# Patient Record
Sex: FEMALE | Race: WHITE | Employment: UNEMPLOYED | URBAN - METROPOLITAN AREA
[De-identification: names, ages, dates, MRNs, and addresses within clinical notes are randomized per-mention and may not be internally consistent; named-entity substitution may affect disease eponyms.]

---

## 2017-01-02 DIAGNOSIS — Z76.0 MEDICATION REFILL: ICD-10-CM

## 2017-01-03 RX ORDER — DIAZEPAM 10 MG/1
TABLET ORAL
Qty: 90 TAB | Refills: 2 | Status: SHIPPED | OUTPATIENT
Start: 2017-01-03 | End: 2017-06-09 | Stop reason: SDUPTHER

## 2017-06-09 DIAGNOSIS — Z76.0 MEDICATION REFILL: ICD-10-CM

## 2017-06-09 RX ORDER — DIAZEPAM 10 MG/1
TABLET ORAL
Qty: 90 TAB | Refills: 2 | Status: SHIPPED | OUTPATIENT
Start: 2017-06-09 | End: 2017-11-29 | Stop reason: SDUPTHER

## 2017-06-09 RX ORDER — BUTALBITAL, ACETAMINOPHEN, CAFFEINE AND CODEINE PHOSPHATE 50; 325; 40; 30 MG/1; MG/1; MG/1; MG/1
1-2 CAPSULE ORAL
Qty: 30 CAP | Refills: 5 | Status: SHIPPED | OUTPATIENT
Start: 2017-06-09 | End: 2018-01-25 | Stop reason: SDUPTHER

## 2017-06-27 ENCOUNTER — OFFICE VISIT (OUTPATIENT)
Dept: INTERNAL MEDICINE CLINIC | Age: 42
End: 2017-06-27

## 2017-06-27 VITALS
SYSTOLIC BLOOD PRESSURE: 120 MMHG | WEIGHT: 166.6 LBS | BODY MASS INDEX: 26.78 KG/M2 | RESPIRATION RATE: 18 BRPM | HEIGHT: 66 IN | DIASTOLIC BLOOD PRESSURE: 73 MMHG | TEMPERATURE: 98.3 F | OXYGEN SATURATION: 98 % | HEART RATE: 76 BPM

## 2017-06-27 DIAGNOSIS — M62.838 MUSCLE SPASMS OF NECK: ICD-10-CM

## 2017-06-27 DIAGNOSIS — F17.200 TOBACCO USE DISORDER: Primary | ICD-10-CM

## 2017-06-27 DIAGNOSIS — Z76.0 MEDICATION REFILL: ICD-10-CM

## 2017-06-27 RX ORDER — CYCLOBENZAPRINE HCL 10 MG
10 TABLET ORAL
Qty: 30 TAB | Refills: 5 | Status: SHIPPED | OUTPATIENT
Start: 2017-06-27 | End: 2018-02-28 | Stop reason: SDUPTHER

## 2017-06-27 RX ORDER — VARENICLINE TARTRATE 1 MG/1
TABLET, FILM COATED ORAL
Qty: 60 TAB | Refills: 1 | Status: SHIPPED | OUTPATIENT
Start: 2017-06-27 | End: 2017-09-25

## 2017-06-27 RX ORDER — NAPROXEN 500 MG/1
500 TABLET ORAL 2 TIMES DAILY WITH MEALS
Qty: 60 TAB | Refills: 5 | Status: SHIPPED | OUTPATIENT
Start: 2017-06-27 | End: 2018-09-18 | Stop reason: SDUPTHER

## 2017-06-27 RX ORDER — VARENICLINE TARTRATE 25 MG
KIT ORAL
Qty: 1 DOSE PACK | Refills: 0 | Status: SHIPPED | OUTPATIENT
Start: 2017-06-27 | End: 2018-09-18 | Stop reason: SDUPTHER

## 2017-06-27 NOTE — PROGRESS NOTES
Chief Complaint   Patient presents with    Nicotine Dependence     pt wants to quit would like to discuss Chantix    Request For New Medication     would like to discuss an anti-inflammatory       Pt preferred language for health care discussion is [place]. Is someone accompanying this pt? no    Is the patient using any DME equipment during OV? no    Depression Screening:  PHQ over the last two weeks 6/27/2017 10/1/2015 2/4/2014   PHQ Not Done Active Diagnosis of Depression or Bipolar Disorder - -   Little interest or pleasure in doing things Not at all More than half the days More than half the days   Feeling down, depressed or hopeless Not at all More than half the days Nearly every day   Total Score PHQ 2 0 4 5   Feeling tired or having little energy - - Nearly every day   Poor appetite or overeating - - Nearly every day   Feeling bad about yourself - or that you are a failure or have let yourself or your family down - - More than half the days   Trouble concentrating on things such as school, work, reading or watching TV - - Nearly every day   Moving or speaking so slowly that other people could have noticed; or the opposite being so fidgety that others notice - - Not at all   Thoughts of being better off dead, or hurting yourself in some way - - Not at all   How difficult have these problems made it for you to do your work, take care of your home and get along with others - - Somewhat difficult       Learning Assessment:  Learning Assessment 6/24/2014   PRIMARY LEARNER Patient   HIGHEST LEVEL OF EDUCATION - PRIMARY LEARNER  4 YEARS OF COLLEGE   BARRIERS PRIMARY LEARNER NONE   CO-LEARNER CAREGIVER No   PRIMARY LANGUAGE ENGLISH    NEED No   LEARNER PREFERENCE PRIMARY DEMONSTRATION   LEARNING SPECIAL TOPICS none   ANSWERED BY self   RELATIONSHIP SELF       Abuse Screening:  No flowsheet data found. Health Maintenance reviewed and discussed per provider.  Yes    Pt is due for Pneumo-13 or Peumo-23, Tdap. Please order/place referral if appropriate. Advance Directive:  1. Do you have an advance directive in place? Patient Reply:no    2. If not, would you like material regarding how to put one in place? Patient Reply: no    Coordination of Care:  1. Have you been to the ER, urgent care clinic since your last visit? Hospitalized since your last visit? no    2. Have you seen or consulted any other health care providers outside of the [address] [place] [address] since your last visit? Include any pap smears or colon screening.  no
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
## 2017-06-27 NOTE — MR AVS SNAPSHOT
Visit Information Date & Time Provider Department Dept. Phone Encounter #  
 6/27/2017  5:15 PM Paulina Muniz [employer] [phone] [id number] Follow-up Instructions Return in about 3 months (around 9/27/2017) for recheck on smoking. Madhuri Pretty Upcoming Health Maintenance Date Due INFLUENZA AGE 9 TO ADULT 8/1/2017 PAP AKA CERVICAL CYTOLOGY 10/20/2018 DTaP/Tdap/Td series (2 - Td) 6/27/2027 Allergies as of 6/27/2017  Review Complete On: 6/27/2017 By: Paulina Muniz MD  
 No Known Allergies Current Immunizations  Never Reviewed No immunizations on file. Not reviewed this visit You Were Diagnosed With   
  
 Codes Comments Tobacco use disorder    -  Primary ICD-10-CM: V25.278 ICD-9-CM: 305.1 Muscle spasms of neck     ICD-10-CM: T25.864 ICD-9-CM: 728.85 Medication refill     ICD-10-CM: Z76.0 ICD-9-CM: V68.1 Vitals BP Pulse Temp Resp Height(growth percentile) Weight(growth percentile) 120/73 76 98.3 °F (36.8 °C) (Oral) 18 5' 6\" (1.676 m) 166 lb 9.6 oz (75.6 kg) LMP SpO2 BMI OB Status Smoking Status 06/20/2017 98% 26.89 kg/m2 Having regular periods Current Every Day Smoker BMI and BSA Data Body Mass Index Body Surface Area  
 26.89 kg/m 2 1.88 m 2 Preferred Pharmacy Pharmacy Name Phone [facility] [place] [id number] [phone] Your Updated Medication List  
  
   
This list is accurate as of: 6/27/17  6:04 PM.  Always use your most recent med list.  
  
  
  
  
 codeine-butalbital-acetaminophen-caffeine -53-30 mg per capsule Commonly known as:  FIORICET WITH CODEINE Take 1-2 Caps by mouth three (3) times daily as needed for Headache. cyclobenzaprine 10 mg tablet Commonly known as:  FLEXERIL Take 1 Tab by mouth nightly as needed for Muscle Spasm(s). Indications: MUSCLE SPASM diazePAM 10 mg tablet Commonly known as:  VALIUM  
take 1 tablet by mouth every 12 hours if needed for anxiety  
  
 multivitamin tablet Commonly known as:  ONE A DAY Take 1 Tab by mouth daily. naproxen 500 mg tablet Commonly known as:  NAPROSYN Take 1 Tab by mouth two (2) times daily (with meals). pramoxine-hydrocortisone 2.5-1 % topical cream  
Commonly known as:  PROTOFOAM-HC Apply  to affected area three (3) times daily. For maximum of 2 weeks, for hemorrhoids * varenicline 0.5 mg (11)- 1 mg (42) Dspk Commonly known as:  CHANTIX STARTER CLIVE Take as directed on the package * varenicline 1 mg tablet Commonly known as:  Magaly Grijalva Take as directed on the package  
  
 venlafaxine-SR 75 mg capsule Commonly known as:  EFFEXOR-XR Take 3 Caps by mouth daily. * Notice: This list has 2 medication(s) that are the same as other medications prescribed for you. Read the directions carefully, and ask your doctor or other care provider to review them with you. Prescriptions Sent to Pharmacy Refills  
 varenicline (CHANTIX STARTER CLIVE) 0.5 mg (11)- 1 mg (42) DsPk 0 Sig: Take as directed on the package Class: Normal  
 Pharmacy: [place] [address] Ph #: [phone]  
 varenicline (CHANTIX) 1 mg tablet 1 Sig: Take as directed on the package Class: Normal  
 Pharmacy: [place] [address] Ph #: [phone]  
 naproxen (NAPROSYN) 500 mg tablet 5 Sig: Take 1 Tab by mouth two (2) times daily (with meals). Class: Normal  
 Pharmacy: [place] [address] Ph #: [phone] Route: Oral  
 cyclobenzaprine (FLEXERIL) 10 mg tablet 5 Sig: Take 1 Tab by mouth nightly as needed for Muscle Spasm(s). Indications: MUSCLE SPASM  Class: Normal  
 Pharmacy: [id number] [address] [place] [address]  #: [phone] Route: Oral  
  
Follow-up Instructions Return in about 3 months (around 9/27/2017) for recheck on smoking. Akin Brooks Introducing \[facility] [place] [facility]\"" & HEALTH SERVICES! Dear Lynette Alegria: Thank you for requesting a [employer] account. Our records indicate that you already have an active [employer] account. You can access your account anytime at [email or web address] Did you know that you can access your hospital and ER discharge instructions at any time in [employer]? You can also review all of your test results from your hospital stay or ER visit. Additional Information If you have questions, please visit the Frequently Asked Questions section of the [employer] website at [email or web address] Remember, [employer] is NOT to be used for urgent needs. For medical emergencies, dial 911. Now available from your iPhone and Android! Please provide this summary of care documentation to your next provider. Your primary care clinician is listed as [facility]. If you have any questions after today's visit, please call [phone].

## 2017-11-29 DIAGNOSIS — Z76.0 MEDICATION REFILL: ICD-10-CM

## 2017-11-29 RX ORDER — DIAZEPAM 10 MG/1
TABLET ORAL
Qty: 90 TAB | Refills: 1 | Status: SHIPPED | OUTPATIENT
Start: 2017-11-29 | End: 2018-04-12 | Stop reason: SDUPTHER

## 2018-01-25 DIAGNOSIS — Z76.0 MEDICATION REFILL: ICD-10-CM

## 2018-01-25 RX ORDER — BUTALBITAL, ACETAMINOPHEN, CAFFEINE AND CODEINE PHOSPHATE 50; 325; 40; 30 MG/1; MG/1; MG/1; MG/1
CAPSULE ORAL
Qty: 30 CAP | Refills: 4 | Status: SHIPPED | OUTPATIENT
Start: 2018-01-25 | End: 2018-07-06 | Stop reason: SDUPTHER

## 2018-02-24 ENCOUNTER — OFFICE VISIT (OUTPATIENT)
Dept: INTERNAL MEDICINE CLINIC | Age: 43
End: 2018-02-24

## 2018-02-24 VITALS
RESPIRATION RATE: 16 BRPM | TEMPERATURE: 97.9 F | OXYGEN SATURATION: 100 % | WEIGHT: 161 LBS | BODY MASS INDEX: 25.88 KG/M2 | HEART RATE: 67 BPM | HEIGHT: 66 IN | DIASTOLIC BLOOD PRESSURE: 70 MMHG | SYSTOLIC BLOOD PRESSURE: 132 MMHG

## 2018-02-24 DIAGNOSIS — J06.9 VIRAL URI WITH COUGH: Primary | ICD-10-CM

## 2018-02-24 DIAGNOSIS — J02.9 SORE THROAT: ICD-10-CM

## 2018-02-24 DIAGNOSIS — R68.83 CHILLS: ICD-10-CM

## 2018-02-24 LAB
QUICKVUE INFLUENZA TEST: NEGATIVE
S PYO AG THROAT QL: NEGATIVE
VALID INTERNAL CONTROL?: YES
VALID INTERNAL CONTROL?: YES
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
## 2018-02-24 NOTE — MR AVS SNAPSHOT
[facility] 
 
 
 [address] [facility] [address] 
[phone] Patient: Carli Andrade MRN: [id number] PSC:[id number] Visit Information Date & Time Provider Department Dept. Phone Encounter #  
 2/24/2018  3:15 PM [address] [phone] [id number] Upcoming Health Maintenance Date Due Influenza Age 5 to Adult 8/1/2017 PAP AKA CERVICAL CYTOLOGY 10/20/2018 DTaP/Tdap/Td series (2 - Td) 6/27/2027 Allergies as of 2/24/2018  Review Complete On: 2/24/2018 By: Diana Lunsford, DO No Known Allergies Current Immunizations  Never Reviewed No immunizations on file. Not reviewed this visit You Were Diagnosed With   
  
 Codes Comments Viral URI with cough    -  Primary ICD-10-CM: J06.9, B97.89 ICD-9-CM: 465.9 Sore throat     ICD-10-CM: J02.9 ICD-9-CM: 337 Chills     ICD-10-CM: R68.83 ICD-9-CM: 780.64 Vitals BP Pulse Temp Resp Height(growth percentile) Weight(growth percentile) 132/70 (BP 1 Location: Left arm, BP Patient Position: Sitting) 67 97.9 °F (36.6 °C) (Oral) 16 5' 6\" (1.676 m) 161 lb (73 kg) LMP SpO2 BMI OB Status Smoking Status 01/23/2018 (Approximate) 100% 25.99 kg/m2 Having regular periods Current Every Day Smoker Vitals History BMI and BSA Data Body Mass Index Body Surface Area  
 25.99 kg/m 2 1.84 m 2 Preferred Pharmacy Pharmacy Name Phone [facility] [phone] Your Updated Medication List  
  
   
This list is accurate as of 2/24/18  4:08 PM.  Always use your most recent med list.  
  
  
  
  
 codeine-butalbital-acetaminophen-caffeine -66-30 mg capsule Commonly known as:  FIORICET WITH CODEINE  
take 1 to 2 capsules by mouth three times a day if needed for headache  
  
 cyclobenzaprine 10 mg tablet Commonly known as:  FLEXERIL  
 Take 1 Tab by mouth nightly as needed for Muscle Spasm(s). Indications: MUSCLE SPASM  
  
 diazePAM 10 mg tablet Commonly known as:  VALIUM  
take 1 tablet by mouth every 12 hours if needed for anxiety  
  
 multivitamin tablet Commonly known as:  ONE A DAY Take 1 Tab by mouth daily. naproxen 500 mg tablet Commonly known as:  NAPROSYN Take 1 Tab by mouth two (2) times daily (with meals). pramoxine-hydrocortisone 2.5-1 % topical cream  
Commonly known as:  PROTOFOAM-HC Apply  to affected area three (3) times daily. For maximum of 2 weeks, for hemorrhoids  
  
 varenicline 0.5 mg (11)- 1 mg (42) Dspk Commonly known as:  CHANTIX STARTER CLIVE Take as directed on the package  
  
 venlafaxine-SR 75 mg capsule Commonly known as:  EFFEXOR-XR Take 3 Caps by mouth daily. We Performed the Following AMB POC RAPID INFLUENZA TEST [03827 CPT(R)] AMB POC RAPID STREP A [37407 CPT(R)] Patient Instructions Viral Respiratory Infection: Care Instructions Your Care Instructions Viruses are very small organisms. They grow in number after they enter your body. There are many types that cause different illnesses, such as colds and the mumps. The symptoms of a viral respiratory infection often start quickly. They include a fever, sore throat, and runny nose. You may also just not feel well. Or you may not want to eat much. Most viral respiratory infections are not serious. They usually get better with time and self-care. Antibiotics are not used to treat a viral infection. That's because antibiotics will not help cure a viral illness. In some cases, antiviral medicine can help your body fight a serious viral infection. Follow-up care is a key part of your treatment and safety. Be sure to make and go to all appointments, and call your doctor if you are having problems. It's also a good idea to know your test results and keep a list of the medicines you take. How can you care for yourself at home? · Rest as much as possible until you feel better. · Be safe with medicines. Take your medicine exactly as prescribed. Call your doctor if you think you are having a problem with your medicine. You will get more details on the specific medicine your doctor prescribes. · Take an over-the-counter pain medicine, such as acetaminophen (Tylenol), ibuprofen (Advil, Motrin), or naproxen (Aleve), as needed for pain and fever. Read and follow all instructions on the label. Do not give aspirin to anyone younger than 20. It has been linked to Reye syndrome, a serious illness. · Drink plenty of fluids, enough so that your urine is light yellow or clear like water. Hot fluids, such as tea or soup, may help relieve congestion in your nose and throat. If you have kidney, heart, or liver disease and have to limit fluids, talk with your doctor before you increase the amount of fluids you drink. · Try to clear mucus from your lungs by breathing deeply and coughing. · Gargle with warm salt water once an hour. This can help reduce swelling and throat pain. Use 1 teaspoon of salt mixed in 1 cup of warm water. · Do not smoke or allow others to smoke around you. If you need help quitting, talk to your doctor about stop-smoking programs and medicines. These can increase your chances of quitting for good. To avoid spreading the virus · Cough or sneeze into a tissue. Then throw the tissue away. · If you don't have a tissue, use your hand to cover your cough or sneeze. Then clean your hand. You can also cough into your sleeve. · Wash your hands often. Use soap and warm water. Wash for 15 to 20 seconds each time. · If you don't have soap and water near you, you can clean your hands with alcohol wipes or gel. When should you call for help? Call your doctor now or seek immediate medical care if: 
? · You have a new or higher fever. ? · Your fever lasts more than 48 hours. ? · You have trouble breathing. ? · You have a fever with a stiff neck or a severe headache. ? · You are sensitive to light. ? · You feel very sleepy or confused. ? Watch closely for changes in your health, and be sure to contact your doctor if: 
? · You do not get better as expected. Where can you learn more? Go to http://charbel-carlos.info/. Enter Y096 in the search box to learn more about \"Viral Respiratory Infection: Care Instructions. \" Current as of: May 12, 2017 Content Version: 11.4 © 7860-4315 Ghostery. Care instructions adapted under license by [employer] (which disclaims liability or warranty for this information). If you have questions about a medical condition or this instruction, always ask your healthcare professional. [address] any warranty or liability for your use of this information. Saline Nasal Washes: Care Instructions Your Care Instructions Saline nasal washes help keep the nasal passages open by washing out thick or dried mucus. This simple remedy can help relieve symptoms of allergies, sinusitis, and colds. It also can make the nose feel more comfortable by keeping the mucous membranes moist. You may notice a little burning sensation in your nose the first few times you use the solution, but this usually gets better in a few days. Follow-up care is a key part of your treatment and safety. Be sure to make and go to all appointments, and call your doctor if you are having problems. It's also a good idea to know your test results and keep a list of the medicines you take. How can you care for yourself at home? · You can buy premixed saline solution in a squeeze bottle or other sinus rinse products at a drugstore. Read and follow the instructions on the label. · You also can make your own saline solution by adding 1 teaspoon of salt and 1 teaspoon of baking soda to 2 cups of distilled water. · If you use a homemade solution, pour a small amount into a clean bowl. Using a rubber bulb syringe, squeeze the syringe and place the tip in the salt water. Pull a small amount of the salt water into the syringe by relaxing your hand. · Sit down with your head tilted slightly back. Do not lie down. Put the tip of the bulb syringe or the squeeze bottle a little way into one of your nostrils. Gently drip or squirt a few drops into the nostril. Repeat with the other nostril. Some sneezing and gagging are normal at first. 
· Gently blow your nose. · Wipe the syringe or bottle tip clean after each use. · Repeat this 2 or 3 times a day. · Use nasal washes gently if you have nosebleeds often. When should you call for help? Watch closely for changes in your health, and be sure to contact your doctor if: 
? · You often get nosebleeds. ? · You have problems doing the nasal washes. Where can you learn more? Go to http://charbel-carlos.info/. Enter [id number] in the search box to learn more about \"Saline Nasal Washes: Care Instructions. \" Current as of: May 12, 2017 Content Version: 11.4 © 6330-0048 Wir3s. Care instructions adapted under license by [employer] (which disclaims liability or warranty for this information). If you have questions about a medical condition or this instruction, always ask your healthcare professional. [place] [address] any warranty or liability for your use of this information. Sore Throat: Care Instructions Your Care Instructions Infection by bacteria or a virus causes most sore throats. Cigarette smoke, dry air, air pollution, allergies, and yelling can also cause a sore throat. Sore throats can be painful and annoying. Fortunately, most sore throats go away on their own. If you have a bacterial infection, your doctor may prescribe antibiotics. Follow-up care is a key part of your treatment and safety. Be sure to make and go to all appointments, and call your doctor if you are having problems. It's also a good idea to know your test results and keep a list of the medicines you take. How can you care for yourself at home? · If your doctor prescribed antibiotics, take them as directed. Do not stop taking them just because you feel better. You need to take the full course of antibiotics. · Gargle with warm salt water once an hour to help reduce swelling and relieve discomfort. Use 1 teaspoon of salt mixed in 1 cup of warm water. · Take an over-the-counter pain medicine, such as acetaminophen (Tylenol), ibuprofen (Advil, Motrin), or naproxen (Aleve). Read and follow all instructions on the label. · Be careful when taking over-the-counter cold or flu medicines and Tylenol at the same time. Many of these medicines have acetaminophen, which is Tylenol. Read the labels to make sure that you are not taking more than the recommended dose. Too much acetaminophen (Tylenol) can be harmful. · Drink plenty of fluids. Fluids may help soothe an irritated throat. Hot fluids, such as tea or soup, may help decrease throat pain. · Use over-the-counter throat lozenges to soothe pain. Regular cough drops or hard candy may also help. These should not be given to young children because of the risk of choking. · Do not smoke or allow others to smoke around you. If you need help quitting, talk to your doctor about stop-smoking programs and medicines. These can increase your chances of quitting for good. · Use a vaporizer or humidifier to add moisture to your bedroom. Follow the directions for cleaning the machine. When should you call for help? Call your doctor now or seek immediate medical care if: 
? · You have new or worse trouble swallowing. ? · Your sore throat gets much worse on one side. ? Watch closely for changes in your health, and be sure to contact your doctor if you do not get better as expected. Where can you learn more? Go to http://charbel-carlos.info/. Enter [id number] in the search box to learn more about \"Sore Throat: Care Instructions. \" Current as of: May 12, 2017 Content Version: 11.4 © 3076-7596 Cyntellect. Care instructions adapted under license by Sazze (which disclaims liability or warranty for this information). If you have questions about a medical condition or this instruction, always ask your healthcare professional. [address] any warranty or liability for your use of this information. Introducing [address]! Dear Maricarmen Ramirez: Thank you for requesting a Kerecis account. Our records indicate that you already have an active Kerecis account. You can access your account anytime at [email or web address] Did you know that you can access your hospital and ER discharge instructions at any time in Kerecis? You can also review all of your test results from your hospital stay or ER visit. Additional Information If you have questions, please visit the Frequently Asked Questions section of the Kerecis website at [email or web address] Remember, Kerecis is NOT to be used for urgent needs. For medical emergencies, dial 911. Now available from your iPhone and Android! Please provide this summary of care documentation to your next provider. Your primary care clinician is listed as [facility]. If you have any questions after today's visit, please call [phone].

## 2018-02-24 NOTE — PATIENT INSTRUCTIONS
Viral Respiratory Infection: Care Instructions  Your Care Instructions    Viruses are very small organisms. They grow in number after they enter your body. There are many types that cause different illnesses, such as colds and the mumps. The symptoms of a viral respiratory infection often start quickly. They include a fever, sore throat, and runny nose. You may also just not feel well. Or you may not want to eat much. Most viral respiratory infections are not serious. They usually get better with time and self-care. Antibiotics are not used to treat a viral infection. That's because antibiotics will not help cure a viral illness. In some cases, antiviral medicine can help your body fight a serious viral infection. Follow-up care is a key part of your treatment and safety. Be sure to make and go to all appointments, and call your doctor if you are having problems. It's also a good idea to know your test results and keep a list of the medicines you take. How can you care for yourself at home? · Rest as much as possible until you feel better. · Be safe with medicines. Take your medicine exactly as prescribed. Call your doctor if you think you are having a problem with your medicine. You will get more details on the specific medicine your doctor prescribes. · Take an over-the-counter pain medicine, such as acetaminophen (Tylenol), ibuprofen (Advil, Motrin), or naproxen (Aleve), as needed for pain and fever. Read and follow all instructions on the label. Do not give aspirin to anyone younger than 20. It has been linked to Reye syndrome, a serious illness. · Drink plenty of fluids, enough so that your urine is light yellow or clear like water. Hot fluids, such as tea or soup, may help relieve congestion in your nose and throat. If you have kidney, heart, or liver disease and have to limit fluids, talk with your doctor before you increase the amount of fluids you drink.   · Try to clear mucus from your lungs by breathing deeply and coughing. · Gargle with warm salt water once an hour. This can help reduce swelling and throat pain. Use 1 teaspoon of salt mixed in 1 cup of warm water. · Do not smoke or allow others to smoke around you. If you need help quitting, talk to your doctor about stop-smoking programs and medicines. These can increase your chances of quitting for good. To avoid spreading the virus  · Cough or sneeze into a tissue. Then throw the tissue away. · If you don't have a tissue, use your hand to cover your cough or sneeze. Then clean your hand. You can also cough into your sleeve. · Wash your hands often. Use soap and warm water. Wash for 15 to 20 seconds each time. · If you don't have soap and water near you, you can clean your hands with alcohol wipes or gel. When should you call for help? Call your doctor now or seek immediate medical care if:  ? · You have a new or higher fever. ? · Your fever lasts more than 48 hours. ? · You have trouble breathing. ? · You have a fever with a stiff neck or a severe headache. ? · You are sensitive to light. ? · You feel very sleepy or confused. ? Watch closely for changes in your health, and be sure to contact your doctor if:  ? · You do not get better as expected. Where can you learn more? Go to http://charbel-carlos.info/. Enter K992 in the search box to learn more about \"Viral Respiratory Infection: Care Instructions. \"  Current as of: May 12, 2017  Content Version: 11.4  © 5388-8317 Adv[employer]eme [employer]. Care instructions adapted under license by [employer] (which disclaims liability or warranty for this information). If you have questions about a medical condition or this instruction, always ask your healthcare professional. [place] [address] any warranty or liability for your use of this information.        Saline Nasal Washes: Care Instructions  Your Care Instructions  Saline nasal washes help keep the nasal passages open by washing out thick or dried mucus. This simple remedy can help relieve symptoms of allergies, sinusitis, and colds. It also can make the nose feel more comfortable by keeping the mucous membranes moist. You may notice a little burning sensation in your nose the first few times you use the solution, but this usually gets better in a few days. Follow-up care is a key part of your treatment and safety. Be sure to make and go to all appointments, and call your doctor if you are having problems. It's also a good idea to know your test results and keep a list of the medicines you take. How can you care for yourself at home? · You can buy premixed saline solution in a squeeze bottle or other sinus rinse products at a drugstore. Read and follow the instructions on the label. · You also can make your own saline solution by adding 1 teaspoon of salt and 1 teaspoon of baking soda to 2 cups of distilled water. · If you use a homemade solution, pour a small amount into a clean bowl. Using a rubber bulb syringe, squeeze the syringe and place the tip in the salt water. Pull a small amount of the salt water into the syringe by relaxing your hand. · Sit down with your head tilted slightly back. Do not lie down. Put the tip of the bulb syringe or the squeeze bottle a little way into one of your nostrils. Gently drip or squirt a few drops into the nostril. Repeat with the other nostril. Some sneezing and gagging are normal at first.  · Gently blow your nose. · Wipe the syringe or bottle tip clean after each use. · Repeat this 2 or 3 times a day. · Use nasal washes gently if you have nosebleeds often. When should you call for help? Watch closely for changes in your health, and be sure to contact your doctor if:  ? · You often get nosebleeds. ? · You have problems doing the nasal washes. Where can you learn more? Go to http://charbel-carlos.info/.   Enter [id number] [id number] in the search box to learn more about \"Saline Nasal Washes: Care Instructions. \"  Current as of: May 12, 2017  Content Version: 11.4  © 5768-5318 [employer]. Care instructions adapted under license by Bare[employer] (which disclaims liability or warranty for this information). If you have questions about a medical condition or this instruction, always ask your healthcare professional. [address] any warranty or liability for your use of this information. Sore Throat: Care Instructions  Your Care Instructions    Infection by bacteria or a virus causes most sore throats. Cigarette smoke, dry air, air pollution, allergies, and yelling can also cause a sore throat. Sore throats can be painful and annoying. Fortunately, most sore throats go away on their own. If you have a bacterial infection, your doctor may prescribe antibiotics. Follow-up care is a key part of your treatment and safety. Be sure to make and go to all appointments, and call your doctor if you are having problems. It's also a good idea to know your test results and keep a list of the medicines you take. How can you care for yourself at home? · If your doctor prescribed antibiotics, take them as directed. Do not stop taking them just because you feel better. You need to take the full course of antibiotics. · Gargle with warm salt water once an hour to help reduce swelling and relieve discomfort. Use 1 teaspoon of salt mixed in 1 cup of warm water. · Take an over-the-counter pain medicine, such as acetaminophen (Tylenol), ibuprofen (Advil, Motrin), or naproxen (Aleve). Read and follow all instructions on the label. · Be careful when taking over-the-counter cold or flu medicines and Tylenol at the same time. Many of these medicines have acetaminophen, which is Tylenol. Read the labels to make sure that you are not taking more than the recommended dose. Too much acetaminophen (Tylenol) can be harmful.   · Drink plenty of fluids. Fluids may help soothe an irritated throat. Hot fluids, such as tea or soup, may help decrease throat pain. · Use over-the-counter throat lozenges to soothe pain. Regular cough drops or hard candy may also help. These should not be given to young children because of the risk of choking. · Do not smoke or allow others to smoke around you. If you need help quitting, talk to your doctor about stop-smoking programs and medicines. These can increase your chances of quitting for good. · Use a vaporizer or humidifier to add moisture to your bedroom. Follow the directions for cleaning the machine. When should you call for help? Call your doctor now or seek immediate medical care if:  ? · You have new or worse trouble swallowing. ? · Your sore throat gets much worse on one side. ? Watch closely for changes in your health, and be sure to contact your doctor if you do not get better as expected. Where can you learn more? Go to http://charbel-carlos.info/. Enter [id number] in the search box to learn more about \"Sore Throat: Care Instructions. \"  Current as of: May 12, 2017  Content Version: 11.4  © 7894-0067 Healthwise, [employer]reet. Care instructions adapted under license by "[employer] (which disclaims liability or warranty for this information). If you have questions about a medical condition or this instruction, always ask your healthcare professional. [place] [address] any warranty or liability for your use of this information.

## 2018-02-24 NOTE — PROGRESS NOTES
Claudia Poe presents today for   Chief Complaint   Patient presents with    Ear Pain     bilateral     Sore Throat     x this am     Sweats     and chills x last pm        Claudia Poe preferred language for health care discussion is english/other. Is someone accompanying this pt? No  Is the patient using any DME equipment during OV?  No     Depression Screening:  PHQ over the last two weeks 2/24/2018 6/27/2017 10/1/2015 2/4/2014   PHQ Not Done Active Diagnosis of Depression or Bipolar Disorder Active Diagnosis of Depression or Bipolar Disorder - -   Little interest or pleasure in doing things Not at all Not at all More than half the days More than half the days   Feeling down, depressed or hopeless Not at all Not at all More than half the days Nearly every day   Total Score PHQ 2 0 0 4 5   Feeling tired or having little energy - - - Nearly every day   Poor appetite or overeating - - - Nearly every day   Feeling bad about yourself - or that you are a failure or have let yourself or your family down - - - More than half the days   Trouble concentrating on things such as school, work, reading or watching TV - - - Nearly every day   Moving or speaking so slowly that other people could have noticed; or the opposite being so fidgety that others notice - - - Not at all   Thoughts of being better off dead, or hurting yourself in some way - - - Not at all   How difficult have these problems made it for you to do your work, take care of your home and get along with others - - - Somewhat difficult       Learning Assessment:  Learning Assessment 6/24/2014   PRIMARY LEARNER Patient   HIGHEST LEVEL OF EDUCATION - PRIMARY LEARNER  4 YEARS OF COLLEGE   BARRIERS PRIMARY LEARNER NONE   CO-LEARNER CAREGIVER No   PRIMARY LANGUAGE ENGLISH    NEED No   LEARNER PREFERENCE PRIMARY DEMONSTRATION   LEARNING SPECIAL TOPICS none   ANSWERED BY self   RELATIONSHIP SELF       Abuse Screening:  Completed    Fall Risk  No flowsheet data found. Health Maintenance reviewed and discussed per provider. Yes; Pt will f/u with PCP. Advance Directive:  1. Do you have an advance directive in place? Patient Reply: No     2. If not, would you like material regarding how to put one in place? Patient Reply: No     Coordination of Care:  1. Have you been to the ER, urgent care clinic since your last visit? Hospitalized since your last visit? No     2. Have you seen or consulted any other health care providers outside of the [address] [place] [address] since your last visit?   No

## 2018-02-28 DIAGNOSIS — Z76.0 MEDICATION REFILL: ICD-10-CM

## 2018-02-28 DIAGNOSIS — M62.838 MUSCLE SPASMS OF NECK: ICD-10-CM

## 2018-02-28 RX ORDER — CYCLOBENZAPRINE HCL 10 MG
TABLET ORAL
Qty: 30 TAB | Refills: 5 | Status: SHIPPED | OUTPATIENT
Start: 2018-02-28 | End: 2019-03-13 | Stop reason: SDUPTHER

## 2018-04-03 DIAGNOSIS — Z76.0 MEDICATION REFILL: ICD-10-CM

## 2018-04-03 RX ORDER — VENLAFAXINE HYDROCHLORIDE 75 MG/1
225 CAPSULE, EXTENDED RELEASE ORAL DAILY
Qty: 270 CAP | Refills: 5 | Status: SHIPPED | OUTPATIENT
Start: 2018-04-03 | End: 2019-04-17 | Stop reason: SDUPTHER

## 2018-04-03 NOTE — TELEPHONE ENCOUNTER
Requested Prescriptions     Pending Prescriptions Disp Refills    venlafaxine-SR (EFFEXOR-XR) 75 mg capsule 270 Cap 5     Sig: Take 3 Caps by mouth daily.

## 2018-04-12 DIAGNOSIS — Z76.0 MEDICATION REFILL: ICD-10-CM

## 2018-04-12 RX ORDER — DIAZEPAM 10 MG/1
TABLET ORAL
Qty: 90 TAB | Refills: 1 | Status: SHIPPED | OUTPATIENT
Start: 2018-04-12 | End: 2018-09-18 | Stop reason: SDUPTHER

## 2018-09-14 ENCOUNTER — TELEPHONE (OUTPATIENT)
Dept: INTERNAL MEDICINE CLINIC | Age: 43
End: 2018-09-14

## 2018-09-18 ENCOUNTER — OFFICE VISIT (OUTPATIENT)
Dept: INTERNAL MEDICINE CLINIC | Age: 43
End: 2018-09-18

## 2018-09-18 VITALS
HEIGHT: 66 IN | OXYGEN SATURATION: 100 % | WEIGHT: 157 LBS | BODY MASS INDEX: 25.23 KG/M2 | SYSTOLIC BLOOD PRESSURE: 131 MMHG | HEART RATE: 73 BPM | TEMPERATURE: 98.2 F | RESPIRATION RATE: 20 BRPM | DIASTOLIC BLOOD PRESSURE: 75 MMHG

## 2018-09-18 DIAGNOSIS — Z13.1 SCREENING FOR DIABETES MELLITUS: ICD-10-CM

## 2018-09-18 DIAGNOSIS — F17.200 TOBACCO USE DISORDER: ICD-10-CM

## 2018-09-18 DIAGNOSIS — K64.9 HEMORRHOIDS, UNSPECIFIED HEMORRHOID TYPE: ICD-10-CM

## 2018-09-18 DIAGNOSIS — Z13.6 SCREENING FOR CARDIOVASCULAR CONDITION: ICD-10-CM

## 2018-09-18 DIAGNOSIS — Z76.0 MEDICATION REFILL: ICD-10-CM

## 2018-09-18 DIAGNOSIS — M62.838 MUSCLE SPASMS OF NECK: Primary | ICD-10-CM

## 2018-09-18 DIAGNOSIS — Z12.31 ENCOUNTER FOR SCREENING MAMMOGRAM FOR BREAST CANCER: ICD-10-CM

## 2018-09-18 DIAGNOSIS — Z13.0 SCREENING FOR DEFICIENCY ANEMIA: ICD-10-CM

## 2018-09-18 RX ORDER — DIAZEPAM 10 MG/1
10 TABLET ORAL
Qty: 90 TAB | Refills: 1 | Status: SHIPPED | OUTPATIENT
Start: 2018-09-18 | End: 2019-02-18 | Stop reason: SDUPTHER

## 2018-09-18 RX ORDER — NAPROXEN 500 MG/1
500 TABLET ORAL 2 TIMES DAILY WITH MEALS
Qty: 60 TAB | Refills: 5 | Status: SHIPPED | OUTPATIENT
Start: 2018-09-18 | End: 2019-12-04 | Stop reason: SDUPTHER

## 2018-09-18 RX ORDER — VARENICLINE TARTRATE 25 MG
KIT ORAL
Qty: 1 DOSE PACK | Refills: 0 | Status: SHIPPED | OUTPATIENT
Start: 2018-09-18 | End: 2020-01-29

## 2018-09-18 RX ORDER — METHOCARBAMOL 500 MG/1
500 TABLET, FILM COATED ORAL 3 TIMES DAILY
Qty: 60 TAB | Refills: 5 | Status: SHIPPED | OUTPATIENT
Start: 2018-09-18 | End: 2020-09-06

## 2018-09-18 RX ORDER — HYDROCORTISONE ACETATE, PRAMOXINE HCL 2.5; 1 G/100G; G/100G
CREAM TOPICAL 3 TIMES DAILY
Qty: 28.4 G | Refills: 5 | Status: SHIPPED | OUTPATIENT
Start: 2018-09-18 | End: 2021-12-15

## 2018-09-18 NOTE — PROGRESS NOTES
HISTORY OF PRESENT ILLNESS Tess Prado is a 41 y.o. female. Visit Vitals  /75 (BP 1 Location: Right arm, BP Patient Position: Sitting)  Pulse 73  Temp 98.2 °F (36.8 °C) (Oral)  Resp 20  
 Ht 5' 6\" (1.676 m)  Wt 157 lb (71.2 kg)  LMP 08/11/2018 (Approximate)  SpO2 100%  BMI 25.34 kg/m2 HPI Comments: Would like a RX for Chantix. Is worried about the side effects however. Medication Refill The history is provided by the patient. This is a new problem. Pertinent negatives include no chest pain, no headaches and no shortness of breath. Neck Pain The history is provided by the patient. The current episode started more than 1 week ago. The problem occurs daily. The problem has been gradually improving. Pertinent negatives include no chest pain, no headaches and no shortness of breath. Exacerbated by: certain neck positions. The symptoms are relieved by medications. Treatments tried: muscle relaxer and NSAID. The treatment provided moderate relief. Review of Systems Constitutional: Negative. Respiratory: Negative for shortness of breath. Cardiovascular: Negative for chest pain and palpitations. Musculoskeletal: Positive for neck pain. Neurological: Negative for dizziness and headaches. Physical Exam  
Constitutional: She is oriented to person, place, and time. She appears well-developed and well-nourished. No distress. Cardiovascular: Normal rate. Pulmonary/Chest: Effort normal.  
Musculoskeletal:  
Still some tightness in her neck. Flexion slightly limited. Neurological: She is alert and oriented to person, place, and time. Skin: Skin is warm and dry. She is not diaphoretic. Psychiatric: She has a normal mood and affect. Nursing note and vitals reviewed. ASSESSMENT and PLAN 
  ICD-10-CM ICD-9-CM 1. Muscle spasms of neck M62.838 728.85 naproxen (NAPROSYN) 500 mg tablet 2.  Hemorrhoids, unspecified hemorrhoid type K64.9 455.6 pramoxine-hydrocortisone (PROTOFOAM-HC) 2.5-1 % topical cream  
3. Medication refill Z76.0 V68.1 diazePAM (VALIUM) 10 mg tablet 4. Tobacco use disorder F17.200 305.1 varenicline (CHANTIX STARTER CLIVE) 0.5 mg (11)- 1 mg (42) DsPk 5. Encounter for screening mammogram for breast cancer Z12.31 V76.12 [facility] M[facility] BI SCREENING INCL CAD 6. Screening for cardiovascular condition Z13.6 V81.2 LIPID PANEL 7. Screening for diabetes mellitus I40.7 C93.3 METABOLIC PANEL, COMPREHENSIVE 8. Screening for deficiency anemia Z13.0 V78.1 CBC WITH AUTOMATED DIFF Neck spasms and tightness continue Uses flexeril at night. Would like RX robaxin for the daytime as it does not sedate her as much Other [facility]ills as [facility] Incidentally, Discussed BMI/weight, lifestyle, diet and exercise. Discussed effect on blood pressure, blood sugar, and joints especially Focus on limiting white carbs, portion control, and moving more.  
She has actually been losing weight and is very close to normal. 
 
Return for Carissa. Sultana Barber 41

## 2018-09-18 NOTE — PROGRESS NOTES
ROOM # 4 Jewell Louis presents today for Chief Complaint Patient presents with  Medication Refill Jewell Louis preferred language for health care discussion is english/other. Is someone accompanying this pt? no 
 
Is the patient using any DME equipment during OV? no 
 
Depression Screening: PHQ over the last two weeks 9/18/2018 2/24/2018 6/27/2017 10/1/2015 2/4/2014 PHQ Not Done - Active Diagnosis of Depression or Bipolar Disorder Active Diagnosis of Depression or Bipolar Disorder - - Little interest or pleasure in doing things Several days Not at all Not at all More than half the days More than half the days Feeling down, depressed, irritable, or hopeless Several days Not at all Not at all More than half the days Nearly every day Total Score PHQ 2 2 0 0 4 5 Feeling tired or having little energy - - - - Nearly every day Poor appetite, weight loss, or overeating - - - - Nearly every day Feeling bad about yourself - or that you are a failure or have let yourself or your family down - - - - More than half the days Trouble concentrating on things such as school, work, reading, or watching TV - - - - Nearly every day Moving or speaking so slowly that other people could have noticed; or the opposite being so fidgety that others notice - - - - Not at all Thoughts of being better off dead, or hurting yourself in some way - - - - Not at all How difficult have these problems made it for you to do your work, take care of your home and get along with others - - - - Somewhat difficult Learning Assessment: 
Learning Assessment 6/24/2014 PRIMARY LEARNER Patient HIGHEST LEVEL OF EDUCATION - PRIMARY LEARNER  4 YEARS OF COLLEGE  
BARRIERS PRIMARY LEARNER NONE  
CO-LEARNER CAREGIVER No  
PRIMARY LANGUAGE ENGLISH  NEED No  
LEARNER PREFERENCE PRIMARY DEMONSTRATION  
LEARNING SPECIAL TOPICS none ANSWERED BY self RELATIONSHIP SELF Abuse Screening: Abuse Screening Questionnaire 2/24/2018 Do you ever feel afraid of your partner? Mort Leaver Are you in a relationship with someone who physically or mentally threatens you? Mort Leaver Is it safe for you to go home? Ayesha Nolasco Health Maintenance reviewed and discussed per provider. Yes Zeeshan Malagon is due for Health Maintenance Due Topic Date Due  Influenza Age 5 to Adult  08/01/2018  PAP AKA CERVICAL CYTOLOGY  10/20/2018 Please order/place referral if appropriate. Advance Directive: 1. Do you have an advance directive in place? Patient Reply: no 
 
2. If not, would you like material regarding how to put one in place? Patient Reply: yes Coordination of Care: 1. Have you been to the ER, urgent care clinic since your last visit? Hospitalized since your last visit? no 
 
2. Have you seen or consulted any other health care providers outside of the [facility] since your last visit? Include any pap smears or colon screening.  no

## 2018-09-18 NOTE — MR AVS SNAPSHOT
[address] [place] [address] 
 
 
 [address] [facility] [address] [address] 
[phone] Patient: Kristy Kunz MRN: [id number] ADITHYA:[birth year] Visit Information Date & Time Provider Department Dept. Phone Encounter #  
 9/18/2018  8:15 AM Natividad You MD [facility] [phone] [id number] Follow-up Instructions Return in about 1 month (around 10/18/2018) for Well Woman Exam. Upcoming Health Maintenance Date Due Influenza Age 5 to Adult 8/1/2018 PAP AKA CERVICAL CYTOLOGY 10/20/2018 DTaP/Tdap/Td series (2 - Td) 6/27/2027 Allergies as of 9/18/2018  Review Complete On: 9/18/2018 By: Natividad You MD  
 No Known Allergies Current Immunizations  Never Reviewed No immunizations on file. Not reviewed this visit You Were Diagnosed With   
  
 Codes Comments Muscle spasms of neck    -  Primary ICD-10-CM: R79.197 ICD-9-CM: 728.85 Hemorrhoids, unspecified hemorrhoid type     ICD-10-CM: K64.9 ICD-9-CM: 455.6 Medication refill     ICD-10-CM: Z76.0 ICD-9-CM: V68.1 Tobacco use disorder     ICD-10-CM: F17.200 ICD-9-CM: 305.1 Encounter for screening mammogram for breast cancer     ICD-10-CM: Z12.31 
ICD-9-CM: V76.12 Screening for cardiovascular condition     ICD-10-CM: Z13.6 ICD-9-CM: V81.2 Screening for diabetes mellitus     ICD-10-CM: Z13.1 ICD-9-CM: V77.1 Screening for deficiency anemia     ICD-10-CM: Z13.0 ICD-9-CM: V78.1 Vitals BP Pulse Temp Resp Height(growth percentile) Weight(growth percentile) 131/75 (BP 1 Location: Right arm, BP Patient Position: Sitting) 73 98.2 °F (36.8 °C) (Oral) 20 5' 6\" (1.676 m) 157 lb (71.2 kg) LMP SpO2 BMI OB Status Smoking Status 08/11/2018 (Approximate) 100% 25.34 kg/m2 Having regular periods Current Every Day Smoker Vitals History BMI and BSA Data  Body Mass Index Body Surface Area  
 25.34 kg/m 2 1.82 m 2  
  
 Preferred Pharmacy Pharmacy Name Phone [address] [phone] Your Updated Medication List  
  
   
This list is accurate as of 9/18/18  8:45 AM.  Always use your most recent med list.  
  
  
  
  
 codeine-butalbital-acetaminophen-caffeine -18-30 mg capsule Commonly known as:  FIORICET WITH CODEINE  
take 1-2 capsules by mouth three times a day if needed for headache  
  
 cyclobenzaprine 10 mg tablet Commonly known as:  FLEXERIL  
take 1 tablet by mouth every evening if needed for muscle spasm  
  
 diazePAM 10 mg tablet Commonly known as:  VALIUM Take 1 Tab by mouth every twelve (12) hours as needed for Anxiety. Max Daily Amount: 20 mg.  
  
 methocarbamol 500 mg tablet Commonly known as:  ROBAXIN Take 1 Tab by mouth three (3) times daily. multivitamin tablet Commonly known as:  ONE A DAY Take 1 Tab by mouth daily. naproxen 500 mg tablet Commonly known as:  NAPROSYN Take 1 Tab by mouth two (2) times daily (with meals). pramoxine-hydrocortisone 2.5-1 % topical cream  
Commonly known as:  PROTOFOAM-HC Apply  to affected area three (3) times daily. For maximum of 2 weeks, for hemorrhoids  
  
 varenicline 0.5 mg (11)- 1 mg (42) Dspk Commonly known as:  CHANTIX STARTER CLIVE Take as directed on the package  
  
 venlafaxine-SR 75 mg capsule Commonly known as:  EFFEXOR-XR Take 3 Caps by mouth daily. Prescriptions Printed Refills  
 diazePAM (VALIUM) 10 mg tablet 1 Sig: Take 1 Tab by mouth every twelve (12) hours as needed for Anxiety. Max Daily Amount: 20 mg.  
 Class: Print Route: Oral  
  
Prescriptions Sent to Pharmacy Refills  
 naproxen (NAPROSYN) 500 mg tablet 5 Sig: Take 1 Tab by mouth two (2) times daily (with meals). Class: Normal  
 [facility] [address] [address] [address]  #: [phone]  Route: Oral  
 pramoxine-hydrocortisone (PROTOFOAM-HC) 2.5-1 % topical cream 5 Sig: Apply  to affected area three (3) times daily. For maximum of 2 weeks, for hemorrhoids Class: Normal  
 Pharmacy: [place] [address] Ph #: [phone] Route: Topical  
 methocarbamol (ROBAXIN) 500 mg tablet 5 Sig: Take 1 Tab by mouth three (3) times daily. Class: Normal  
 Pharmacy: [place] [address] Ph #: [phone] Route: Oral  
 varenicline (CHANTIX STARTER CLIVE) 0.5 mg (11)- 1 mg (42) DsPk 0 Sig: Take as directed on the package Class: Normal  
 Pharmacy: [place] [address] Ph #: [phone] Follow-up Instructions Return in about 1 month (around 10/18/2018) for Well Woman Exam. To-Do List   
 09/18/2018 Lab:  CBC WITH AUTOMATED DIFF   
  
 09/18/2018 Lab:  LIPID PANEL Around 09/18/2018 Imaging:  [facility] MAMMO BI SCREENING INCL CAD   
  
 09/18/2018 Lab:  METABOLIC PANEL, COMPREHENSIVE [facility]! Dear Zeinab Strong: Thank you for requesting a Jumpstarter account. Our records indicate that you already have an active Jumpstarter account. You can access your account anytime at [email or web address] Did you know that you can access your hospital and ER discharge instructions at any time in Jumpstarter? You can also review all of your test results from your hospital stay or ER visit. Additional Information If you have questions, please visit the Frequently Asked Questions section of the Jumpstarter website at [email or web address] Remember, Jumpstarter is NOT to be used for urgent needs. For medical emergencies, dial 911. Now available from your iPhone and Android! Please provide this summary of care documentation to your next provider. Your primary care clinician is listed as [facility]. If you have any questions after today's visit, please call [phone].

## 2018-09-21 NOTE — TELEPHONE ENCOUNTER
PA submitted via telephone. Was informed that PA has gone to pharm for further review. Turn around time in 4 days.

## 2018-09-26 NOTE — TELEPHONE ENCOUNTER
Pharm contacted. 2 pt identifiers confirmed. Was informed that pt has been notified and prescription was picked up yesterday 09/25/2018.

## 2018-09-28 ENCOUNTER — HOSPITAL ENCOUNTER (OUTPATIENT)
Dept: MAMMOGRAPHY | Age: 43
Discharge: HOME | End: 2018-09-28
Attending: INTERNAL MEDICINE
Payer: COMMERCIAL

## 2018-09-28 DIAGNOSIS — Z12.31 ENCOUNTER FOR SCREENING MAMMOGRAM FOR BREAST CANCER: ICD-10-CM

## 2018-09-28 PROCEDURE — 77067 SCR MAMMO BI INCL CAD: CPT

## 2018-10-18 ENCOUNTER — TELEPHONE (OUTPATIENT)
Dept: INTERNAL MEDICINE CLINIC | Age: 43
End: 2018-10-18

## 2018-10-18 DIAGNOSIS — F17.200 SMOKING ADDICTION: Primary | ICD-10-CM

## 2018-10-18 RX ORDER — VARENICLINE TARTRATE 1 MG/1
1 TABLET, FILM COATED ORAL 2 TIMES DAILY
Qty: 60 TAB | Refills: 2 | Status: SHIPPED | OUTPATIENT
Start: 2018-10-18 | End: 2020-01-29 | Stop reason: SDUPTHER

## 2018-10-18 NOTE — TELEPHONE ENCOUNTER
Patient calling as directed by PCP to inform PCP when she is almost finish with her starter pack of Chantix to get the continuing pack.

## 2018-10-18 NOTE — TELEPHONE ENCOUNTER
Attempted to contact pt at  number, no answer  Pt mailbox is full and not accepting calls at this time. Will continue to try to contact pt.

## 2018-10-20 ENCOUNTER — LAB ONLY (OUTPATIENT)
Dept: INTERNAL MEDICINE CLINIC | Age: 43
End: 2018-10-20

## 2018-10-21 LAB
ALBUMIN SERPL-MCNC: 4.6 G/DL (ref 3.5–5.5)
ALBUMIN/GLOB SERPL: 2.1 {RATIO} (ref 1.2–2.2)
ALP SERPL-CCNC: 74 IU/L (ref 39–117)
ALT SERPL-CCNC: 20 IU/L (ref 0–32)
AST SERPL-CCNC: 16 IU/L (ref 0–40)
BASOPHILS # BLD AUTO: 0.1 X10E3/UL (ref 0–0.2)
BASOPHILS NFR BLD AUTO: 1 %
BILIRUB SERPL-MCNC: 0.2 MG/DL (ref 0–1.2)
BUN SERPL-MCNC: 9 MG/DL (ref 6–24)
BUN/CREAT SERPL: 13 (ref 9–23)
CALCIUM SERPL-MCNC: 7.8 MG/DL (ref 8.7–10.2)
CHLORIDE SERPL-SCNC: 101 MMOL/L (ref 96–106)
CHOLEST SERPL-MCNC: 240 MG/DL (ref 100–199)
CO2 SERPL-SCNC: 24 MMOL/L (ref 20–29)
CREAT SERPL-MCNC: 0.69 MG/DL (ref 0.57–1)
EOSINOPHIL # BLD AUTO: 0.1 X10E3/UL (ref 0–0.4)
EOSINOPHIL NFR BLD AUTO: 2 %
ERYTHROCYTE [DISTWIDTH] IN BLOOD BY AUTOMATED COUNT: 12.8 % (ref 12.3–15.4)
GLOBULIN SER CALC-MCNC: 2.2 G/DL (ref 1.5–4.5)
GLUCOSE SERPL-MCNC: 95 MG/DL (ref 65–99)
HCT VFR BLD AUTO: 41.1 % (ref 34–46.6)
HDLC SERPL-MCNC: 66 MG/DL
HGB BLD-MCNC: 13.6 G/DL (ref 11.1–15.9)
IMM GRANULOCYTES # BLD: 0 X10E3/UL (ref 0–0.1)
IMM GRANULOCYTES NFR BLD: 0 %
INTERPRETATION, 910389: NORMAL
LDLC SERPL CALC-MCNC: 141 MG/DL (ref 0–99)
LYMPHOCYTES # BLD AUTO: 2.5 X10E3/UL (ref 0.7–3.1)
LYMPHOCYTES NFR BLD AUTO: 34 %
MCH RBC QN AUTO: 31.7 PG (ref 26.6–33)
MCHC RBC AUTO-ENTMCNC: 33.1 G/DL (ref 31.5–35.7)
MCV RBC AUTO: 96 FL (ref 79–97)
MONOCYTES # BLD AUTO: 0.5 X10E3/UL (ref 0.1–0.9)
MONOCYTES NFR BLD AUTO: 7 %
NEUTROPHILS # BLD AUTO: 4.1 X10E3/UL (ref 1.4–7)
NEUTROPHILS NFR BLD AUTO: 56 %
PLATELET # BLD AUTO: 363 X10E3/UL (ref 150–379)
POTASSIUM SERPL-SCNC: 4.9 MMOL/L (ref 3.5–5.2)
PROT SERPL-MCNC: 6.8 G/DL (ref 6–8.5)
RBC # BLD AUTO: 4.29 X10E6/UL (ref 3.77–5.28)
SODIUM SERPL-SCNC: 145 MMOL/L (ref 134–144)
SPECIMEN STATUS REPORT, ROLRST: NORMAL
TRIGL SERPL-MCNC: 167 MG/DL (ref 0–149)
VLDLC SERPL CALC-MCNC: 33 MG/DL (ref 5–40)
WBC # BLD AUTO: 7.4 X10E3/UL (ref 3.4–10.8)

## 2018-11-01 ENCOUNTER — OFFICE VISIT (OUTPATIENT)
Dept: INTERNAL MEDICINE CLINIC | Age: 43
End: 2018-11-01

## 2018-11-01 VITALS
DIASTOLIC BLOOD PRESSURE: 75 MMHG | SYSTOLIC BLOOD PRESSURE: 110 MMHG | TEMPERATURE: 98.2 F | OXYGEN SATURATION: 100 % | BODY MASS INDEX: 25.23 KG/M2 | RESPIRATION RATE: 18 BRPM | HEIGHT: 66 IN | WEIGHT: 157 LBS | HEART RATE: 76 BPM

## 2018-11-01 DIAGNOSIS — Z76.0 MEDICATION REFILL: ICD-10-CM

## 2018-11-01 DIAGNOSIS — Z01.419 WELL WOMAN EXAM WITH ROUTINE GYNECOLOGICAL EXAM: Primary | ICD-10-CM

## 2018-11-01 RX ORDER — BUTALBITAL, ACETAMINOPHEN, CAFFEINE AND CODEINE PHOSPHATE 50; 325; 40; 30 MG/1; MG/1; MG/1; MG/1
CAPSULE ORAL
Qty: 30 CAP | Refills: 5 | Status: SHIPPED | OUTPATIENT
Start: 2018-11-01 | End: 2019-04-25 | Stop reason: SDUPTHER

## 2018-11-01 NOTE — PROGRESS NOTES
ROOM # 835 [facility] Bishop Mosher presents today for   Chief Complaint   Patient presents with    Physical     well women exam       [place] preferred language for health care discussion is english/other.     Is someone accompanying this pt? no    Is the patient using any DME equipment during OV? no    Depression Screening:  PHQ over the last two weeks 9/18/2018 2/24/2018 6/27/2017 10/1/2015 2/4/2014   PHQ Not Done - Active Diagnosis of Depression or Bipolar Disorder Active Diagnosis of Depression or Bipolar Disorder - -   Little interest or pleasure in doing things Several days Not at all Not at all More than half the days More than half the days   Feeling down, depressed, irritable, or hopeless Several days Not at all Not at all More than half the days Nearly every day   Total Score PHQ 2 2 0 0 4 5   Feeling tired or having little energy - - - - Nearly every day   Poor appetite, weight loss, or overeating - - - - Nearly every day   Feeling bad about yourself - or that you are a failure or have let yourself or your family down - - - - More than half the days   Trouble concentrating on things such as school, work, reading, or watching TV - - - - Nearly every day   Moving or speaking so slowly that other people could have noticed; or the opposite being so fidgety that others notice - - - - Not at all   Thoughts of being better off dead, or hurting yourself in some way - - - - Not at all   How difficult have these problems made it for you to do your work, take care of your home and get along with others - - - - Somewhat difficult       Learning Assessment:  Learning Assessment 6/24/2014   PRIMARY LEARNER Patient   HIGHEST LEVEL OF EDUCATION - PRIMARY LEARNER  4 YEARS OF COLLEGE   BARRIERS PRIMARY LEARNER NONE   CO-LEARNER CAREGIVER No   PRIMARY LANGUAGE ENGLISH    NEED No   LEARNER PREFERENCE PRIMARY DEMONSTRATION   LEARNING SPECIAL TOPICS none   ANSWERED BY self   RELATIONSHIP SELF       Abuse Screening:  Abuse Screening Questionnaire 2/24/2018   Do you ever feel afraid of your partner? N   Are you in a relationship with someone who physically or mentally threatens you? N   Is it safe for you to go home? Y       Fall Risk  No flowsheet data found. Health Maintenance reviewed and discussed per provider. Yes    Scottie Peterson is due for   Health Maintenance Due   Topic Date Due    PAP AKA CERVICAL CYTOLOGY  10/20/2018         Please order/place referral if appropriate. Advance Directive:  1. Do you have an advance directive in place? Patient Reply: no    2. If not, would you like material regarding how to put one in place? Patient Reply: no    Coordination of Care:  1. Have you been to the ER, urgent care clinic since your last visit? Hospitalized since your last visit? no    2. Have you seen or consulted any other health care providers outside of the [address] [place] [address] since your last visit? Include any pap smears or colon screening.  no

## 2018-11-01 NOTE — PROGRESS NOTES
HISTORY OF PRESENT ILLNESS  Kuldip Cortes is a 41 y.o. female. Visit Vitals  /75 (BP 1 Location: Left arm, BP Patient Position: Sitting)   Pulse 76   Temp 98.2 °F (36.8 °C) (Oral)   Resp 18   Ht 5' 6\" (1.676 m)   Wt 157 lb (71.2 kg)   SpO2 100%   BMI 25.34 kg/m²       Well Woman   The history is provided by the patient. This is a new problem. Review of Systems   All other systems reviewed and are negative. Physical Exam   Constitutional: She is oriented to person, place, and time. She appears well-developed and well-nourished. No distress. Cardiovascular: Normal rate and regular rhythm. Pulmonary/Chest: Effort normal and breath sounds normal.   Genitourinary: No breast swelling, tenderness or discharge. There is no rash or tenderness on the right labia. There is no rash or tenderness on the left labia. Cervix exhibits friability. Cervix exhibits no motion tenderness and no discharge. Right adnexum displays no mass, no tenderness and no fullness. Left adnexum displays no mass, no tenderness and no fullness. Genitourinary Comments: cx looks inflamed   Musculoskeletal: She exhibits no edema. Neurological: She is alert and oriented to person, place, and time. Skin: Skin is warm and dry. She is not diaphoretic. Psychiatric: She has a normal mood and affect. Nursing note and vitals reviewed. ASSESSMENT and PLAN    ICD-10-CM ICD-9-CM    1. Well woman exam with routine gynecological exam Z01.419 V72.31 PAP + HPV DNA (HIGH RISK)      PAP + HPV DNA (HIGH RISK)   2. Medication refill Z76.0 V68.1 codeine-butalbital-acetaminophen-caffeine (FIORICET WITH CODEINE) -89-30 mg capsule       Reviewed recent lab and discussed cholesterol which is too high    May need referral back to gyn given cervical friability.  Awaiting PAP and HPV results first    Refill as noted    Has stopped smoking on chantix    F/u 6 months for med refills

## 2018-11-08 ENCOUNTER — TELEPHONE (OUTPATIENT)
Dept: INTERNAL MEDICINE CLINIC | Age: 43
End: 2018-11-08

## 2018-11-08 LAB
CYTOLOGIST CVX/VAG CYTO: NORMAL
CYTOLOGIST CVX/VAG CYTO: NORMAL
DX ICD CODE: NORMAL
DX ICD CODE: NORMAL
HPV I/H RISK 1 DNA CVX QL PROBE+SIG AMP: NEGATIVE
Lab: NORMAL
OTHER STN SPEC: NORMAL
OTHER STN SPEC: NORMAL
PATH REPORT.FINAL DX SPEC: NORMAL
PATH REPORT.FINAL DX SPEC: NORMAL
SPECIMEN STATUS REPORT, ROLRST: NORMAL
STAT OF ADQ CVX/VAG CYTO-IMP: NORMAL
STAT OF ADQ CVX/VAG CYTO-IMP: NORMAL

## 2018-11-08 NOTE — TELEPHONE ENCOUNTER
Patient called in stating she received an email telling her the labs she did were not processed due to the nurse forgetting to put her name on the specimen. Would like to speak with someone about this.   Can be reached at [phone]

## 2018-11-09 NOTE — TELEPHONE ENCOUNTER
Written by Jordon Gotti MD on 11/8/2018  6:44 PM   There was some confusion in the processing of your specimen. But everything is OK. Both the PAP and HPV were negative. Next screening can be in 4-5 years.

## 2018-11-09 NOTE — TELEPHONE ENCOUNTER
Called pt 2 pt identifiers confirmed informed pt of Dr Brody Cameron note below, pt stated understanding no other questions or concerns noted at this time.

## 2019-01-19 ENCOUNTER — TELEPHONE (OUTPATIENT)
Dept: INTERNAL MEDICINE CLINIC | Age: 44
End: 2019-01-19

## 2019-01-19 NOTE — TELEPHONE ENCOUNTER
PA requested for codeine-butalbital-acetaminophen-caffeine (FIORICET WITH CODEINE) -71-30 mg capsule     Document scanned into chart

## 2019-01-22 NOTE — TELEPHONE ENCOUNTER
PA attempted. Medication is available without Authorization. Pharm contacted. Pharm states they have a paid claim for this medication.

## 2019-01-22 NOTE — TELEPHONE ENCOUNTER
Attempted to initiate prior authorization for codeine-butalbital-acetaminophen-caffeine -04-30 mg capsule via CoverMyMeds. Received message that medication is available without authorization. Outgoing call to [employer]. Spoke to pharmacy staff, Jeniffer Bhatt. Medication claim went through [employer] with no issue.

## 2019-02-18 DIAGNOSIS — Z76.0 MEDICATION REFILL: ICD-10-CM

## 2019-02-18 RX ORDER — DIAZEPAM 10 MG/1
10 TABLET ORAL
Qty: 90 TAB | Refills: 1 | Status: SHIPPED | OUTPATIENT
Start: 2019-02-18 | End: 2019-06-10 | Stop reason: SDUPTHER

## 2019-02-18 NOTE — TELEPHONE ENCOUNTER
Requested Prescriptions     Pending Prescriptions Disp Refills    diazePAM (VALIUM) 10 mg tablet 90 Tab 1     Sig: Take 1 Tab by mouth every twelve (12) hours as needed for Anxiety. Max Daily Amount: 20 mg.

## 2019-02-18 NOTE — TELEPHONE ENCOUNTER
PCP: Angela Sandoval MD    Last appt: 11/1/2018  No future appointments. Requested Prescriptions     Pending Prescriptions Disp Refills    diazePAM (VALIUM) 10 mg tablet 90 Tab 1     Sig: Take 1 Tab by mouth every twelve (12) hours as needed for Anxiety. Max Daily Amount: 20 mg. Request for a 30 or 90 day supply? Provider Discretion    Pharmacy: [facility]     Other Comments:   printed and placed in PCP medication refill review folder.      Last UDS date:   Pain contract signed:

## 2019-03-13 DIAGNOSIS — M62.838 MUSCLE SPASMS OF NECK: ICD-10-CM

## 2019-03-13 DIAGNOSIS — Z76.0 MEDICATION REFILL: ICD-10-CM

## 2019-03-13 RX ORDER — CYCLOBENZAPRINE HCL 10 MG
TABLET ORAL
Qty: 30 TAB | Refills: 5 | Status: SHIPPED | OUTPATIENT
Start: 2019-03-13 | End: 2019-12-04 | Stop reason: SDUPTHER

## 2019-04-17 DIAGNOSIS — Z76.0 MEDICATION REFILL: ICD-10-CM

## 2019-04-17 RX ORDER — VENLAFAXINE HYDROCHLORIDE 75 MG/1
CAPSULE, EXTENDED RELEASE ORAL
Qty: 270 CAP | Refills: 5 | Status: SHIPPED | OUTPATIENT
Start: 2019-04-17 | End: 2020-02-11

## 2019-04-25 DIAGNOSIS — Z76.0 MEDICATION REFILL: ICD-10-CM

## 2019-04-25 RX ORDER — BUTALBITAL, ACETAMINOPHEN, CAFFEINE AND CODEINE PHOSPHATE 50; 325; 40; 30 MG/1; MG/1; MG/1; MG/1
2 CAPSULE ORAL
Qty: 30 CAP | Refills: 5 | Status: SHIPPED | OUTPATIENT
Start: 2019-04-25 | End: 2019-05-25

## 2019-06-10 DIAGNOSIS — Z76.0 MEDICATION REFILL: ICD-10-CM

## 2019-06-11 RX ORDER — DIAZEPAM 10 MG/1
TABLET ORAL
Qty: 90 TAB | Refills: 1 | Status: SHIPPED | OUTPATIENT
Start: 2019-06-11 | End: 2019-10-17 | Stop reason: SDUPTHER

## 2019-08-29 DIAGNOSIS — R51.9 NONINTRACTABLE EPISODIC HEADACHE, UNSPECIFIED HEADACHE TYPE: Primary | Chronic | ICD-10-CM

## 2019-08-29 RX ORDER — BUTALBITAL, ACETAMINOPHEN, CAFFEINE AND CODEINE PHOSPHATE 50; 325; 40; 30 MG/1; MG/1; MG/1; MG/1
CAPSULE ORAL
Qty: 30 CAP | Refills: 5 | Status: SHIPPED | OUTPATIENT
Start: 2019-08-29 | End: 2020-01-23 | Stop reason: SDUPTHER

## 2019-10-17 DIAGNOSIS — Z76.0 MEDICATION REFILL: ICD-10-CM

## 2019-10-17 RX ORDER — DIAZEPAM 10 MG/1
TABLET ORAL
Qty: 90 TAB | Refills: 1 | Status: SHIPPED | OUTPATIENT
Start: 2019-10-17 | End: 2020-01-23 | Stop reason: SDUPTHER

## 2019-10-25 ENCOUNTER — TELEPHONE (OUTPATIENT)
Dept: INTERNAL MEDICINE CLINIC | Age: 44
End: 2019-10-25

## 2019-10-25 NOTE — TELEPHONE ENCOUNTER
[facility] is requesting a prior auth for [facility] ER 75 mg ER    Last appointment 11/1/2018

## 2019-10-29 NOTE — TELEPHONE ENCOUNTER
Jyoti has not yet replied to your PA request.   If Delmis Thayer has not replied to your request within 24 hours please contact Jyoti at [phone].

## 2019-10-30 NOTE — TELEPHONE ENCOUNTER
Prior Auth for patient medication Venlafaxine HCL ER 75 mg cap has been approved for coverage. This approval is effective from 10/30/2019 until 10/29/2020.

## 2019-12-04 DIAGNOSIS — Z76.0 MEDICATION REFILL: ICD-10-CM

## 2019-12-04 DIAGNOSIS — M62.838 MUSCLE SPASMS OF NECK: ICD-10-CM

## 2019-12-04 RX ORDER — CYCLOBENZAPRINE HCL 10 MG
TABLET ORAL
Qty: 30 TAB | Refills: 5 | Status: SHIPPED | OUTPATIENT
Start: 2019-12-04 | End: 2020-10-15

## 2019-12-04 RX ORDER — NAPROXEN 500 MG/1
TABLET ORAL
Qty: 60 TAB | Refills: 5 | Status: SHIPPED | OUTPATIENT
Start: 2019-12-04 | End: 2021-01-18

## 2020-01-02 ENCOUNTER — HOSPITAL ENCOUNTER (OUTPATIENT)
Dept: MAMMOGRAPHY | Age: 45
Discharge: HOME | End: 2020-01-02
Attending: INTERNAL MEDICINE
Payer: COMMERCIAL

## 2020-01-02 DIAGNOSIS — Z12.31 ENCOUNTER FOR SCREENING MAMMOGRAM FOR BREAST CANCER: ICD-10-CM

## 2020-01-02 PROCEDURE — 77067 SCR MAMMO BI INCL CAD: CPT

## 2020-01-02 PROCEDURE — 77063 BREAST TOMOSYNTHESIS BI: CPT

## 2020-01-23 ENCOUNTER — OFFICE VISIT (OUTPATIENT)
Dept: INTERNAL MEDICINE CLINIC | Age: 45
End: 2020-01-23

## 2020-01-23 VITALS
OXYGEN SATURATION: 98 % | HEIGHT: 66 IN | TEMPERATURE: 98 F | HEART RATE: 78 BPM | SYSTOLIC BLOOD PRESSURE: 114 MMHG | RESPIRATION RATE: 18 BRPM | DIASTOLIC BLOOD PRESSURE: 75 MMHG | BODY MASS INDEX: 23.95 KG/M2 | WEIGHT: 149 LBS

## 2020-01-23 DIAGNOSIS — R51.9 NONINTRACTABLE EPISODIC HEADACHE, UNSPECIFIED HEADACHE TYPE: Chronic | ICD-10-CM

## 2020-01-23 DIAGNOSIS — S00.31XA ABRASION OF NOSE WITH INFECTION, INITIAL ENCOUNTER: Primary | ICD-10-CM

## 2020-01-23 DIAGNOSIS — Z76.0 MEDICATION REFILL: ICD-10-CM

## 2020-01-23 DIAGNOSIS — L08.9 ABRASION OF NOSE WITH INFECTION, INITIAL ENCOUNTER: Primary | ICD-10-CM

## 2020-01-23 RX ORDER — BUTALBITAL, ACETAMINOPHEN, CAFFEINE AND CODEINE PHOSPHATE 50; 325; 40; 30 MG/1; MG/1; MG/1; MG/1
2 CAPSULE ORAL 3 TIMES DAILY
Qty: 30 CAP | Refills: 5 | Status: SHIPPED | OUTPATIENT
Start: 2020-01-23 | End: 2020-06-21

## 2020-01-23 RX ORDER — SULFAMETHOXAZOLE AND TRIMETHOPRIM 800; 160 MG/1; MG/1
1 TABLET ORAL 2 TIMES DAILY
Qty: 20 TAB | Refills: 0 | Status: SHIPPED | OUTPATIENT
Start: 2020-01-23 | End: 2020-02-02

## 2020-01-23 RX ORDER — DIAZEPAM 10 MG/1
10 TABLET ORAL
Qty: 90 TAB | Refills: 1 | Status: SHIPPED | OUTPATIENT
Start: 2020-01-23 | End: 2020-05-15

## 2020-01-23 NOTE — PROGRESS NOTES
HISTORY OF PRESENT ILLNESS  Edwin Desouza is a 45 y.o. female. Visit Vitals  /75 (BP 1 Location: Right arm, BP Patient Position: Sitting)   Pulse 78   Temp 98 °F (36.7 °C) (Oral)   Resp 18   Ht 5' 6\" (1.676 m)   Wt 149 lb (67.6 kg)   SpO2 98%   BMI 24.05 kg/m²       2-3 week altogether of upper respiratory sxs  Yesterday her glands began swelling  Nose became sore and there is a little crack with swelling in left nares    Nasal Pain   The history is provided by the patient (see comments). This is a new problem. Mass   The history is provided by the patient (see comments about swollen glands). This is a new problem. Review of Systems   Constitutional: Negative for chills and fever. HENT: Positive for sinus pain. Sore bump on nose--in the past this became a staph infection   Respiratory: Positive for cough. Skin: Itching: swollen lymph nodes. Physical Exam  Vitals signs and nursing note reviewed. Constitutional:       Appearance: She is well-developed. HENT:      Head:        Right Ear: Tympanic membrane, ear canal and external ear normal.      Left Ear: Ear canal and external ear normal.      Nose:      Comments: Some posterior pharynx draiange  Neck:      Musculoskeletal: Normal range of motion. Cardiovascular:      Rate and Rhythm: Normal rate and regular rhythm. Pulmonary:      Effort: Pulmonary effort is normal.      Breath sounds: Normal breath sounds. Lymphadenopathy:      Cervical: Cervical adenopathy present. Skin:     General: Skin is warm and dry. Neurological:      General: No focal deficit present. Mental Status: She is alert and oriented to person, place, and time. Psychiatric:         Mood and Affect: Mood normal.         ASSESSMENT and PLAN    ICD-10-CM ICD-9-CM    1. Abrasion of nose with infection, initial encounter S00.31XA 910.1     L08.9     2.  Nonintractable episodic headache, unspecified headache type R51 784.0 codeine-butalbital-acetaminophen-caffeine (FIORICET WITH CODEINE) -43-30 mg capsule   3. Medication refill Z76.0 V68.1 diazePAM (VALIUM) 10 mg tablet         given h/o staph infection (even though 10 yrs ago) will tx with bactrim  Warm compresses prn    F/u if not responding in 3-4 days.  So will add second medication

## 2020-01-23 NOTE — PROGRESS NOTES
ROOM # 801 [facility] presents today for   Chief Complaint   Patient presents with    Nasal Pain    Mass       Terressa Eagles preferred language for health care discussion is english/other.     Is someone accompanying this pt? no    Is the patient using any DME equipment during OV? no    Depression Screening:  3 most recent PHQ Screens 9/18/2018 2/24/2018 6/27/2017 10/1/2015 2/4/2014   PHQ Not Done - Active Diagnosis of Depression or Bipolar Disorder Active Diagnosis of Depression or Bipolar Disorder - -   Little interest or pleasure in doing things Several days Not at all Not at all More than half the days More than half the days   Feeling down, depressed, irritable, or hopeless Several days Not at all Not at all More than half the days Nearly every day   Total Score PHQ 2 2 0 0 4 5   Feeling tired or having little energy - - - - Nearly every day   Poor appetite, weight loss, or overeating - - - - Nearly every day   Feeling bad about yourself - or that you are a failure or have let yourself or your family down - - - - More than half the days   Trouble concentrating on things such as school, work, reading, or watching TV - - - - Nearly every day   Moving or speaking so slowly that other people could have noticed; or the opposite being so fidgety that others notice - - - - Not at all   Thoughts of being better off dead, or hurting yourself in some way - - - - Not at all   How difficult have these problems made it for you to do your work, take care of your home and get along with others - - - - Somewhat difficult       Learning Assessment:  Learning Assessment 6/24/2014   PRIMARY LEARNER Patient   HIGHEST LEVEL OF EDUCATION - PRIMARY LEARNER  4 YEARS OF COLLEGE   BARRIERS PRIMARY LEARNER NONE   CO-LEARNER CAREGIVER No   PRIMARY LANGUAGE ENGLISH    NEED No   LEARNER PREFERENCE PRIMARY DEMONSTRATION   LEARNING SPECIAL TOPICS none   ANSWERED BY self   RELATIONSHIP SELF       Abuse Screening:  Abuse Screening Questionnaire 2/24/2018   Do you ever feel afraid of your partner? N   Are you in a relationship with someone who physically or mentally threatens you? N   Is it safe for you to go home? Y       Fall Risk  No flowsheet data found. Health Maintenance reviewed and discussed per provider. Yes    Ree Reddy is due for   Health Maintenance Due   Topic Date Due    Influenza Age 5 to Adult  08/01/2019     Please order/place referral if appropriate. Advance Directive:  1. Do you have an advance directive in place? Patient Reply: no    2. If not, would you like material regarding how to put one in place? Patient Reply: no    Coordination of Care:  1. Have you been to the ER, urgent care clinic since your last visit? Hospitalized since your last visit? no    2. Have you seen or consulted any other health care providers outside of the [address] [place] [address] since your last visit? Include any pap smears or colon screening.  Endocrinologist

## 2020-01-27 ENCOUNTER — TELEPHONE (OUTPATIENT)
Dept: INTERNAL MEDICINE CLINIC | Age: 45
End: 2020-01-27

## 2020-01-27 DIAGNOSIS — L08.9 ABRASION OF NOSE WITH INFECTION, INITIAL ENCOUNTER: Primary | ICD-10-CM

## 2020-01-27 DIAGNOSIS — S00.31XA ABRASION OF NOSE WITH INFECTION, INITIAL ENCOUNTER: Primary | ICD-10-CM

## 2020-01-27 RX ORDER — DOXYCYCLINE 100 MG/1
100 TABLET ORAL 2 TIMES DAILY
Qty: 20 TAB | Refills: 0 | Status: SHIPPED | OUTPATIENT
Start: 2020-01-27 | End: 2020-10-12 | Stop reason: SDUPTHER

## 2020-01-27 NOTE — TELEPHONE ENCOUNTER
Patient is calling in stating the infection in her nose has now spread to her nostril. States she was supposed to call and let you know if it had spread of is not getting better.

## 2020-01-29 ENCOUNTER — TELEPHONE (OUTPATIENT)
Dept: INTERNAL MEDICINE CLINIC | Age: 45
End: 2020-01-29

## 2020-01-29 ENCOUNTER — OFFICE VISIT (OUTPATIENT)
Dept: INTERNAL MEDICINE CLINIC | Age: 45
End: 2020-01-29

## 2020-01-29 VITALS
TEMPERATURE: 98.6 F | HEART RATE: 78 BPM | HEIGHT: 66 IN | OXYGEN SATURATION: 98 % | RESPIRATION RATE: 18 BRPM | DIASTOLIC BLOOD PRESSURE: 72 MMHG | BODY MASS INDEX: 23.95 KG/M2 | WEIGHT: 149 LBS | SYSTOLIC BLOOD PRESSURE: 109 MMHG

## 2020-01-29 DIAGNOSIS — F17.200 SMOKING ADDICTION: ICD-10-CM

## 2020-01-29 DIAGNOSIS — L08.9 ABRASION OF NOSE WITH INFECTION, SUBSEQUENT ENCOUNTER: Primary | ICD-10-CM

## 2020-01-29 DIAGNOSIS — S00.31XD ABRASION OF NOSE WITH INFECTION, SUBSEQUENT ENCOUNTER: Primary | ICD-10-CM

## 2020-01-29 RX ORDER — VARENICLINE TARTRATE 1 MG/1
1 TABLET, FILM COATED ORAL 2 TIMES DAILY
Qty: 60 TAB | Refills: 2 | Status: SHIPPED | OUTPATIENT
Start: 2020-01-29 | End: 2020-05-15

## 2020-01-29 RX ORDER — MUPIROCIN CALCIUM 20 MG/G
0.5 CREAM TOPICAL 2 TIMES DAILY
Qty: 15 G | Refills: 0 | Status: SHIPPED | OUTPATIENT
Start: 2020-01-29 | End: 2020-10-12

## 2020-01-29 NOTE — TELEPHONE ENCOUNTER
Josefina Garcia from pharmacy calling because the mupirocin calcium (BACTROBAN) 2 % nasal ointment is not available at any location. Wants to know if provider wants to switch it to regular mupirocin which is not an ointment or to something else.

## 2020-01-29 NOTE — PROGRESS NOTES
HISTORY OF PRESENT ILLNESS  Zana Jansen is a 45 y.o. female. Visit Vitals  /72 (BP 1 Location: Right arm, BP Patient Position: Sitting)   Pulse 78   Temp 98.6 °F (37 °C) (Oral)   Resp 18   Ht 5' 6\" (1.676 m)   Wt 149 lb (67.6 kg)   SpO2 98%   BMI 24.05 kg/m²           Here to f/u on nasal infection. We started her on bactrim on 1/24/20 and doxycycline on 1/24/20    The inner side of her left nostril is inflamed tender. She has been using neosporin here      Review of Systems   Constitutional: Negative for chills and fever. HENT: Positive for sinus pain (pressure more than pain). Negative for sore throat. Physical Exam  Vitals signs and nursing note reviewed. Constitutional:       Appearance: She is well-developed. HENT:      Nose:     Cardiovascular:      Rate and Rhythm: Normal rate. Pulmonary:      Effort: Pulmonary effort is normal.   Skin:     General: Skin is warm and dry. Neurological:      General: No focal deficit present. Mental Status: She is alert. Psychiatric:         Mood and Affect: Mood normal.         ASSESSMENT and PLAN    ICD-10-CM ICD-9-CM    1. Abrasion of nose with infection, subsequent encounter S00. 31XD V58.89     L08.9     2.  Smoking addiction F17.200 305.1 varenicline (CHANTIX) 1 mg tablet       As she is not really having any system signs and only the distal nares is inflamed, will continue same antibiotics but add bactroban topically in the nostril    Per request will re-start chantix    F/u one week to recheck nose

## 2020-01-29 NOTE — PROGRESS NOTES
ROOM # 801 [facility] presents today for   Chief Complaint   Patient presents with    Nasal Pain       Linda Hernandez preferred language for health care discussion is english/other.     Is someone accompanying this pt? no    Is the patient using any DME equipment during OV? no    Depression Screening:  3 most recent PHQ Screens 1/23/2020 9/18/2018 2/24/2018 6/27/2017 10/1/2015 2/4/2014   PHQ Not Done - - Active Diagnosis of Depression or Bipolar Disorder Active Diagnosis of Depression or Bipolar Disorder - -   Little interest or pleasure in doing things Not at all Several days Not at all Not at all More than half the days More than half the days   Feeling down, depressed, irritable, or hopeless Not at all Several days Not at all Not at all More than half the days Nearly every day   Total Score PHQ 2 0 2 0 0 4 5   Feeling tired or having little energy - - - - - Nearly every day   Poor appetite, weight loss, or overeating - - - - - Nearly every day   Feeling bad about yourself - or that you are a failure or have let yourself or your family down - - - - - More than half the days   Trouble concentrating on things such as school, work, reading, or watching TV - - - - - Nearly every day   Moving or speaking so slowly that other people could have noticed; or the opposite being so fidgety that others notice - - - - - Not at all   Thoughts of being better off dead, or hurting yourself in some way - - - - - Not at all   How difficult have these problems made it for you to do your work, take care of your home and get along with others - - - - - Somewhat difficult       Learning Assessment:  Learning Assessment 6/24/2014   PRIMARY LEARNER Patient   HIGHEST LEVEL OF EDUCATION - PRIMARY LEARNER  4 YEARS OF COLLEGE   BARRIERS PRIMARY LEARNER NONE   CO-LEARNER CAREGIVER No   PRIMARY LANGUAGE ENGLISH    NEED No   LEARNER PREFERENCE PRIMARY DEMONSTRATION   LEARNING SPECIAL TOPICS none   ANSWERED BY self RELATIONSHIP SELF       Abuse Screening:  Abuse Screening Questionnaire 2/24/2018   Do you ever feel afraid of your partner? N   Are you in a relationship with someone who physically or mentally threatens you? N   Is it safe for you to go home? Y       Fall Risk  No flowsheet data found. Health Maintenance reviewed and discussed per provider. Yes    Zeeshan Malagon is due for There are no preventive care reminders to display for this patient. Please order/place referral if appropriate. Advance Directive:  1. Do you have an advance directive in place? Patient Reply: no    2. If not, would you like material regarding how to put one in place? Patient Reply: no    Coordination of Care:  1. Have you been to the ER, urgent care clinic since your last visit? Hospitalized since your last visit? no    2. Have you seen or consulted any other health care providers outside of the [address] [place] [address] since your last visit? Include any pap smears or colon screening.  no

## 2020-01-30 ENCOUNTER — TELEPHONE (OUTPATIENT)
Dept: INTERNAL MEDICINE CLINIC | Age: 45
End: 2020-01-30

## 2020-01-31 NOTE — TELEPHONE ENCOUNTER
Paloma Berry from [address] [place] [address] Rd called and stated that the medication is not out of stock her insurance will not pay for name brand nor will they pay for the cream what they will pay for is the generic brand 22 gram medicine if there is any questions please call [phone]

## 2020-01-31 NOTE — TELEPHONE ENCOUNTER
Spoke with Fartun Goetz. All they needed was OK to use the regular ointment, not the nasal ointment specifically as that is not available. Gave the OK.  To insert 1/2 cm into each nostril

## 2020-02-05 ENCOUNTER — OFFICE VISIT (OUTPATIENT)
Dept: INTERNAL MEDICINE CLINIC | Age: 45
End: 2020-02-05

## 2020-02-05 VITALS
SYSTOLIC BLOOD PRESSURE: 106 MMHG | RESPIRATION RATE: 16 BRPM | DIASTOLIC BLOOD PRESSURE: 66 MMHG | WEIGHT: 149 LBS | TEMPERATURE: 97.8 F | HEART RATE: 72 BPM | HEIGHT: 66 IN | BODY MASS INDEX: 23.95 KG/M2 | OXYGEN SATURATION: 100 %

## 2020-02-05 DIAGNOSIS — J34.0 NASAL ULCER: Primary | ICD-10-CM

## 2020-02-05 NOTE — PROGRESS NOTES
HISTORY OF PRESENT ILLNESS  Damián Mccann is a 45 y.o. female. Visit Vitals  /66 (BP 1 Location: Right arm, BP Patient Position: Sitting)   Pulse 72   Temp 97.8 °F (36.6 °C) (Oral)   Resp 16   Ht 5' 6\" (1.676 m)   Wt 149 lb (67.6 kg)   SpO2 100%   BMI 24.05 kg/m²       Here to f/u on a left nasal infection. Finally got the bactroban ointment and is using twice a day  Has one last antibiotic pill remaining. She notes a little tenderness at times but nothing like it was. No recent bleeding exc when cleaning the crusty areas        Current Outpatient Medications:  varenicline (CHANTIX) 1 mg tablet, Take 1 Tab by mouth two (2) times a day. mupirocin calcium (BACTROBAN) 2 % nasal ointment, 0.5 g by Both Nostrils route two (2) times a day. doxycycline (ADOXA) 100 mg tablet, Take 1 Tab by mouth two (2) times a day for 10 days. diazePAM (VALIUM) 10 mg tablet, Take 1 Tab by mouth every twelve (12) hours as needed for Anxiety. Max Daily Amount: 20 mg.  codeine-butalbital-acetaminophen-caffeine (FIORICET WITH CODEINE) -07-30 mg capsule, Take 2 Caps by mouth three (3) times daily for 30 days. Max Daily Amount: 6 Caps.  naproxen (NAPROSYN) 500 mg tablet, take 1 tablet by mouth twice a day with food  cyclobenzaprine (FLEXERIL) 10 mg tablet, take 1 tablet by mouth every evening if needed for muscle spasm  venlafaxine-SR (EFFEXOR-XR) 75 mg capsule, take 3 capsules by mouth once daily  pramoxine-hydrocortisone (PROTOFOAM-HC) 2.5-1 % topical cream, Apply  to affected area three (3) times daily. For maximum of 2 weeks, for hemorrhoids  methocarbamol (ROBAXIN) 500 mg tablet, Take 1 Tab by mouth three (3) times daily. multivitamin (ONE A DAY) tablet, Take 1 Tab by mouth daily. mupirocin calcium (BACTROBAN) 2 % topical cream, Apply 0.5 g to affected area two (2) times a day. Nasal Pain   The history is provided by the patient. This is a chronic problem. The current episode started more than 1 week ago. The problem occurs daily. The problem has been rapidly improving. Treatments tried: see med list.       Review of Systems   Constitutional: Negative for chills and fever. HENT: Positive for nosebleeds (slight bleeding noted when cleans nose and removes crust from the ulcer area). Negative for congestion, sinus pain and sore throat. Physical Exam  HENT:      Nose:           ASSESSMENT and PLAN    ICD-10-CM ICD-9-CM    1. Nasal ulcer J34.0 478.19        Doing well. Finish pills and continue bactroban ointment until no soreness at all.   Most likely had a staph infection    Should she have recurrent sxs, will refill medication     F/u here prn

## 2020-02-05 NOTE — PROGRESS NOTES
ROOM # 835 [facility] Bishop Mosher presents today for   Chief Complaint   Patient presents with    Nasal Pain       Casie Kong preferred language for health care discussion is english/other.     Is someone accompanying this pt? no    Is the patient using any DME equipment during OV? no    Depression Screening:  3 most recent PHQ Screens 1/29/2020 1/23/2020 9/18/2018 2/24/2018 6/27/2017 10/1/2015 2/4/2014   PHQ Not Done - - - Active Diagnosis of Depression or Bipolar Disorder Active Diagnosis of Depression or Bipolar Disorder - -   Little interest or pleasure in doing things Not at all Not at all Several days Not at all Not at all More than half the days More than half the days   Feeling down, depressed, irritable, or hopeless Not at all Not at all Several days Not at all Not at all More than half the days Nearly every day   Total Score PHQ 2 0 0 2 0 0 4 5   Feeling tired or having little energy - - - - - - Nearly every day   Poor appetite, weight loss, or overeating - - - - - - Nearly every day   Feeling bad about yourself - or that you are a failure or have let yourself or your family down - - - - - - More than half the days   Trouble concentrating on things such as school, work, reading, or watching TV - - - - - - Nearly every day   Moving or speaking so slowly that other people could have noticed; or the opposite being so fidgety that others notice - - - - - - Not at all   Thoughts of being better off dead, or hurting yourself in some way - - - - - - Not at all   How difficult have these problems made it for you to do your work, take care of your home and get along with others - - - - - - Somewhat difficult       Learning Assessment:  Learning Assessment 6/24/2014   PRIMARY LEARNER Patient   HIGHEST LEVEL OF EDUCATION - PRIMARY LEARNER  4 YEARS OF COLLEGE   BARRIERS PRIMARY LEARNER NONE   CO-LEARNER CAREGIVER No   PRIMARY LANGUAGE ENGLISH    NEED No   LEARNER PREFERENCE PRIMARY DEMONSTRATION   LEARNING SPECIAL TOPICS none   ANSWERED BY self   RELATIONSHIP SELF       Abuse Screening:  Abuse Screening Questionnaire 2/24/2018   Do you ever feel afraid of your partner? N   Are you in a relationship with someone who physically or mentally threatens you? N   Is it safe for you to go home? Y       Fall Risk  No flowsheet data found. Health Maintenance reviewed and discussed per provider. Yes    Gracia Marcos is due for There are no preventive care reminders to display for this patient. Please order/place referral if appropriate. Advance Directive:  1. Do you have an advance directive in place? Patient Reply: no    2. If not, would you like material regarding how to put one in place? Patient Reply: no    Coordination of Care:  1. Have you been to the ER, urgent care clinic since your last visit? Hospitalized since your last visit? no    2. Have you seen or consulted any other health care providers outside of the [address] [place] [address] since your last visit? Include any pap smears or colon screening.  no

## 2020-02-11 DIAGNOSIS — Z76.0 MEDICATION REFILL: ICD-10-CM

## 2020-02-11 RX ORDER — VENLAFAXINE HYDROCHLORIDE 75 MG/1
CAPSULE, EXTENDED RELEASE ORAL
Qty: 270 CAP | Refills: 5 | Status: SHIPPED | OUTPATIENT
Start: 2020-02-11 | End: 2020-05-06

## 2020-05-06 DIAGNOSIS — Z76.0 MEDICATION REFILL: ICD-10-CM

## 2020-05-06 RX ORDER — VENLAFAXINE HYDROCHLORIDE 75 MG/1
CAPSULE, EXTENDED RELEASE ORAL
Qty: 270 CAP | Refills: 5 | Status: SHIPPED | OUTPATIENT
Start: 2020-05-06 | End: 2020-12-04 | Stop reason: SDUPTHER

## 2020-06-21 DIAGNOSIS — R51.9 NONINTRACTABLE EPISODIC HEADACHE, UNSPECIFIED HEADACHE TYPE: Chronic | ICD-10-CM

## 2020-06-21 RX ORDER — BUTALBITAL, ACETAMINOPHEN, CAFFEINE AND CODEINE PHOSPHATE 50; 325; 40; 30 MG/1; MG/1; MG/1; MG/1
CAPSULE ORAL
Qty: 30 CAP | Refills: 5 | Status: SHIPPED | OUTPATIENT
Start: 2020-06-21 | End: 2020-10-15

## 2020-08-19 DIAGNOSIS — Z76.0 MEDICATION REFILL: ICD-10-CM

## 2020-08-19 RX ORDER — DIAZEPAM 10 MG/1
TABLET ORAL
Qty: 90 TAB | Refills: 2 | Status: SHIPPED | OUTPATIENT
Start: 2020-08-19 | End: 2021-01-18

## 2020-09-06 RX ORDER — METHOCARBAMOL 500 MG/1
TABLET, FILM COATED ORAL
Qty: 60 TAB | Refills: 5 | Status: SHIPPED | OUTPATIENT
Start: 2020-09-06 | End: 2021-08-18

## 2020-09-25 DIAGNOSIS — F17.200 SMOKING ADDICTION: ICD-10-CM

## 2020-09-25 RX ORDER — VARENICLINE TARTRATE 1 MG/1
TABLET, FILM COATED ORAL
Qty: 60 TAB | Refills: 2 | Status: SHIPPED | OUTPATIENT
Start: 2020-09-25 | End: 2021-01-18

## 2020-10-11 ENCOUNTER — E-VISIT (OUTPATIENT)
Dept: INTERNAL MEDICINE CLINIC | Age: 45
End: 2020-10-11

## 2020-10-11 DIAGNOSIS — S00.31XA ABRASION OF NOSE WITH INFECTION, INITIAL ENCOUNTER: ICD-10-CM

## 2020-10-11 DIAGNOSIS — L08.9 ABRASION OF NOSE WITH INFECTION, INITIAL ENCOUNTER: ICD-10-CM

## 2020-10-12 RX ORDER — DOXYCYCLINE 100 MG/1
100 TABLET ORAL 2 TIMES DAILY
Qty: 20 TAB | Refills: 0 | Status: SHIPPED | OUTPATIENT
Start: 2020-10-12 | End: 2020-10-22

## 2020-10-12 RX ORDER — MUPIROCIN CALCIUM 20 MG/G
0.5 CREAM TOPICAL 2 TIMES DAILY
Qty: 15 G | Refills: 0 | Status: SHIPPED | OUTPATIENT
Start: 2020-10-12 | End: 2021-10-25 | Stop reason: SDUPTHER

## 2020-10-15 DIAGNOSIS — Z76.0 MEDICATION REFILL: ICD-10-CM

## 2020-10-15 DIAGNOSIS — R51.9 NONINTRACTABLE EPISODIC HEADACHE, UNSPECIFIED HEADACHE TYPE: Chronic | ICD-10-CM

## 2020-10-15 DIAGNOSIS — M62.838 MUSCLE SPASMS OF NECK: ICD-10-CM

## 2020-10-15 RX ORDER — CYCLOBENZAPRINE HCL 10 MG
TABLET ORAL
Qty: 30 TAB | Refills: 5 | Status: SHIPPED | OUTPATIENT
Start: 2020-10-15 | End: 2021-08-18

## 2020-10-15 RX ORDER — BUTALBITAL, ACETAMINOPHEN, CAFFEINE AND CODEINE PHOSPHATE 50; 325; 40; 30 MG/1; MG/1; MG/1; MG/1
CAPSULE ORAL
Qty: 30 CAP | Refills: 5 | Status: SHIPPED | OUTPATIENT
Start: 2020-10-15 | End: 2021-02-07

## 2020-12-03 ENCOUNTER — TELEPHONE (OUTPATIENT)
Dept: INTERNAL MEDICINE CLINIC | Age: 45
End: 2020-12-03

## 2020-12-04 DIAGNOSIS — Z76.0 MEDICATION REFILL: ICD-10-CM

## 2020-12-04 RX ORDER — VENLAFAXINE HYDROCHLORIDE 75 MG/1
75 CAPSULE, EXTENDED RELEASE ORAL DAILY
Qty: 90 CAP | Refills: 3 | Status: SHIPPED | OUTPATIENT
Start: 2020-12-04 | End: 2020-12-07 | Stop reason: CLARIF

## 2020-12-04 RX ORDER — VENLAFAXINE HYDROCHLORIDE 150 MG/1
150 CAPSULE, EXTENDED RELEASE ORAL DAILY
Qty: 90 CAP | Refills: 3 | Status: SHIPPED | OUTPATIENT
Start: 2020-12-04 | End: 2020-12-07 | Stop reason: CLARIF

## 2020-12-04 NOTE — PROGRESS NOTES
PA from [facility] denied for three 75mg caps daily  Unable to find a working ph # for [employer].   Called Jocelyn who gave me 2 additional #s:   [phone] neither of which work    Will attempt to send a 75 mg + 150 mg daily for the dose I am trying to prescribe

## 2020-12-28 ENCOUNTER — TRANSCRIBE ORDER (OUTPATIENT)
Dept: SCHEDULING | Age: 45
End: 2020-12-28

## 2020-12-28 DIAGNOSIS — Z12.31 VISIT FOR SCREENING MAMMOGRAM: Primary | ICD-10-CM

## 2021-01-18 DIAGNOSIS — M62.838 MUSCLE SPASMS OF NECK: ICD-10-CM

## 2021-01-18 DIAGNOSIS — Z76.0 MEDICATION REFILL: ICD-10-CM

## 2021-01-18 DIAGNOSIS — F17.200 SMOKING ADDICTION: ICD-10-CM

## 2021-01-18 RX ORDER — DIAZEPAM 10 MG/1
TABLET ORAL
Qty: 90 TAB | Refills: 1 | Status: SHIPPED | OUTPATIENT
Start: 2021-01-18 | End: 2021-05-03

## 2021-01-18 RX ORDER — NAPROXEN 500 MG/1
TABLET ORAL
Qty: 60 TAB | Refills: 5 | OUTPATIENT
Start: 2021-01-18 | End: 2021-12-15

## 2021-01-18 RX ORDER — VARENICLINE TARTRATE 1 MG/1
TABLET, FILM COATED ORAL
Qty: 60 TAB | Refills: 2 | Status: SHIPPED | OUTPATIENT
Start: 2021-01-18 | End: 2021-12-15

## 2021-02-07 DIAGNOSIS — R51.9 NONINTRACTABLE EPISODIC HEADACHE, UNSPECIFIED HEADACHE TYPE: Chronic | ICD-10-CM

## 2021-02-07 RX ORDER — BUTALBITAL, ACETAMINOPHEN, CAFFEINE AND CODEINE PHOSPHATE 50; 325; 40; 30 MG/1; MG/1; MG/1; MG/1
CAPSULE ORAL
Qty: 30 CAP | Refills: 5 | Status: SHIPPED | OUTPATIENT
Start: 2021-02-07 | End: 2021-07-11

## 2021-02-18 ENCOUNTER — OFFICE VISIT (OUTPATIENT)
Dept: INTERNAL MEDICINE CLINIC | Age: 46
End: 2021-02-18
Payer: COMMERCIAL

## 2021-02-18 VITALS
DIASTOLIC BLOOD PRESSURE: 71 MMHG | RESPIRATION RATE: 18 BRPM | OXYGEN SATURATION: 100 % | WEIGHT: 155.8 LBS | HEIGHT: 66 IN | TEMPERATURE: 97.3 F | SYSTOLIC BLOOD PRESSURE: 113 MMHG | HEART RATE: 79 BPM | BODY MASS INDEX: 25.04 KG/M2

## 2021-02-18 DIAGNOSIS — R10.9 ABDOMINAL WALL PAIN: Primary | ICD-10-CM

## 2021-02-18 PROCEDURE — 99213 OFFICE O/P EST LOW 20 MIN: CPT | Performed by: INTERNAL MEDICINE

## 2021-02-18 NOTE — PROGRESS NOTES
HISTORY OF PRESENT ILLNESS  Marielos Vera is a 39 y.o. female. /71 (BP 1 Location: Right upper arm, BP Patient Position: Sitting, BP Cuff Size: Adult)   Pulse 79   Temp 97.3 °F (36.3 °C) (Oral)   Resp 18   Ht 5' 6\" (1.676 m)   Wt 155 lb 12.8 oz (70.7 kg)   LMP 01/18/2021   SpO2 100%   BMI 25.15 kg/m²     Upper left abdominal pain. She is concerned about a hernia. She thought she felt a lump as well since Friday (5d ago)    She does do some planks off and on    Some mild constipation off and on        Mass  The history is provided by the patient. Review of Systems   Constitutional: Negative for chills and fever. Respiratory: Negative. Cardiovascular: Negative. Gastrointestinal: Negative for nausea and vomiting. Abdominal wall pain   Genitourinary: Negative for dysuria and urgency. Physical Exam  Vitals signs and nursing note reviewed. Constitutional:       Appearance: Normal appearance. She is well-developed. HENT:      Head: Normocephalic and atraumatic. Cardiovascular:      Rate and Rhythm: Normal rate and regular rhythm. Pulmonary:      Effort: Pulmonary effort is normal.      Breath sounds: Normal breath sounds. Abdominal:      Comments: No asymmetry. No bulging with abdominal flexing  abd negative. No masses   Skin:     General: Skin is warm and dry. Neurological:      General: No focal deficit present. Mental Status: She is alert and oriented to person, place, and time. Psychiatric:         Mood and Affect: Mood normal.         Behavior: Behavior normal.         ASSESSMENT and PLAN    ICD-10-CM ICD-9-CM    1. Abdominal wall pain  R10.9 789.00        Reassured this is likely a local effect.  No findings of hernia  May be aggravated by intermittent constipation    Should resolve within 2-3 weeks, if not let us know and then we can consider additional testing

## 2021-02-18 NOTE — PROGRESS NOTES
Marshal Ashton is a 39 y.o. female (: 1975) presenting to address:    Chief Complaint   Patient presents with   Patito Theodore Mass     patient c/o knot on her abdomen x 6 days          c/o discomfort               pain scale 0/10       Vitals:    21 0828   BP: 113/71   Pulse: 79   Resp: 18   Temp: 97.3 °F (36.3 °C)   TempSrc: Oral   SpO2: 100%   Weight: 155 lb 12.8 oz (70.7 kg)   Height: 5' 6\" (1.676 m)   PainSc:   0 - No pain   LMP: 2021       Hearing/Vision:   No exam data present    Learning Assessment:     Learning Assessment 2014   PRIMARY LEARNER Patient   HIGHEST LEVEL OF EDUCATION - PRIMARY LEARNER  4 YEARS OF COLLEGE   BARRIERS PRIMARY LEARNER NONE   CO-LEARNER CAREGIVER No   PRIMARY LANGUAGE ENGLISH    NEED No   LEARNER PREFERENCE PRIMARY DEMONSTRATION   LEARNING SPECIAL TOPICS none   ANSWERED BY self   RELATIONSHIP SELF     Depression Screening:     3 most recent PHQ Screens 2021   PHQ Not Done Active Diagnosis of Depression or Bipolar Disorder   Little interest or pleasure in doing things Not at all   Feeling down, depressed, irritable, or hopeless Not at all   Total Score PHQ 2 0   Feeling tired or having little energy -   Poor appetite, weight loss, or overeating -   Feeling bad about yourself - or that you are a failure or have let yourself or your family down -   Trouble concentrating on things such as school, work, reading, or watching TV -   Moving or speaking so slowly that other people could have noticed; or the opposite being so fidgety that others notice -   Thoughts of being better off dead, or hurting yourself in some way -   How difficult have these problems made it for you to do your work, take care of your home and get along with others -     Fall Risk Assessment:     Fall Risk Assessment, last 12 mths 2021   Able to walk? Yes   Fall in past 12 months?  0     Abuse Screening:     Abuse Screening Questionnaire 2018   Do you ever feel afraid of your partner? N   Are you in a relationship with someone who physically or mentally threatens you? N   Is it safe for you to go home? Y     Coordination of Care Questionaire:   1. Have you been to the ER, urgent care clinic since your last visit? Hospitalized since your last visit? NO    2. Have you seen or consulted any other health care providers outside of the [address] [place] [address] since your last visit? Include any pap smears or colon screening. NO    Advanced Directive:   1. Do you have an Advanced Directive? NO    2. Would you like information on Advanced Directives?  NO

## 2021-04-12 ENCOUNTER — HOSPITAL ENCOUNTER (OUTPATIENT)
Dept: WOMENS IMAGING | Age: 46
Discharge: HOME | End: 2021-04-12
Attending: INTERNAL MEDICINE
Payer: COMMERCIAL

## 2021-04-12 DIAGNOSIS — Z12.31 VISIT FOR SCREENING MAMMOGRAM: ICD-10-CM

## 2021-04-12 PROCEDURE — 77063 BREAST TOMOSYNTHESIS BI: CPT

## 2021-05-31 RX ORDER — VENLAFAXINE HYDROCHLORIDE 225 MG/1
TABLET, EXTENDED RELEASE ORAL
Qty: 90 TABLET | Refills: 1 | Status: SHIPPED | OUTPATIENT
Start: 2021-05-31 | End: 2021-12-19

## 2021-07-11 DIAGNOSIS — R51.9 NONINTRACTABLE EPISODIC HEADACHE, UNSPECIFIED HEADACHE TYPE: Chronic | ICD-10-CM

## 2021-07-11 RX ORDER — BUTALBITAL, ACETAMINOPHEN, CAFFEINE AND CODEINE PHOSPHATE 50; 325; 40; 30 MG/1; MG/1; MG/1; MG/1
CAPSULE ORAL
Qty: 30 CAPSULE | Refills: 2 | Status: SHIPPED | OUTPATIENT
Start: 2021-07-11 | End: 2021-09-07

## 2021-08-18 DIAGNOSIS — M62.838 MUSCLE SPASMS OF NECK: ICD-10-CM

## 2021-08-18 DIAGNOSIS — Z76.0 MEDICATION REFILL: ICD-10-CM

## 2021-08-18 RX ORDER — CYCLOBENZAPRINE HCL 10 MG
TABLET ORAL
Qty: 30 TABLET | Refills: 5 | Status: SHIPPED | OUTPATIENT
Start: 2021-08-18 | End: 2021-12-15 | Stop reason: ALTCHOICE

## 2021-09-06 DIAGNOSIS — R51.9 NONINTRACTABLE EPISODIC HEADACHE, UNSPECIFIED HEADACHE TYPE: Chronic | ICD-10-CM

## 2021-09-07 RX ORDER — BUTALBITAL, ACETAMINOPHEN, CAFFEINE AND CODEINE PHOSPHATE 50; 325; 40; 30 MG/1; MG/1; MG/1; MG/1
CAPSULE ORAL
Qty: 30 CAPSULE | Refills: 5 | Status: SHIPPED | OUTPATIENT
Start: 2021-09-07 | End: 2022-01-14

## 2021-10-25 ENCOUNTER — E-VISIT (OUTPATIENT)
Dept: INTERNAL MEDICINE CLINIC | Age: 46
End: 2021-10-25

## 2021-10-25 ENCOUNTER — TELEPHONE (OUTPATIENT)
Dept: INTERNAL MEDICINE CLINIC | Age: 46
End: 2021-10-25

## 2021-10-25 DIAGNOSIS — J31.0 RHINITIS, UNSPECIFIED TYPE: Primary | ICD-10-CM

## 2021-10-25 RX ORDER — MUPIROCIN CALCIUM 20 MG/G
0.5 CREAM TOPICAL 2 TIMES DAILY
Qty: 15 G | Refills: 0 | Status: SHIPPED | OUTPATIENT
Start: 2021-10-25 | End: 2021-10-26 | Stop reason: CLARIF

## 2021-10-25 RX ORDER — DOXYCYCLINE 100 MG/1
100 CAPSULE ORAL 2 TIMES DAILY
Qty: 20 CAPSULE | Refills: 1 | Status: SHIPPED | OUTPATIENT
Start: 2021-10-25 | End: 2021-11-04

## 2021-10-26 ENCOUNTER — TELEPHONE (OUTPATIENT)
Dept: INTERNAL MEDICINE CLINIC | Age: 46
End: 2021-10-26

## 2021-11-29 DIAGNOSIS — Z76.0 MEDICATION REFILL: ICD-10-CM

## 2021-11-29 RX ORDER — DIAZEPAM 10 MG/1
TABLET ORAL
Qty: 90 TABLET | Refills: 2 | Status: SHIPPED | OUTPATIENT
Start: 2021-11-29 | End: 2022-05-02

## 2021-12-15 ENCOUNTER — TELEPHONE (OUTPATIENT)
Dept: INTERNAL MEDICINE CLINIC | Age: 46
End: 2021-12-15

## 2021-12-15 ENCOUNTER — NURSE TRIAGE (OUTPATIENT)
Dept: OTHER | Facility: CLINIC | Age: 46
End: 2021-12-15
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
## 2021-12-19 RX ORDER — VENLAFAXINE HYDROCHLORIDE 225 MG/1
TABLET, EXTENDED RELEASE ORAL
Qty: 90 TABLET | Refills: 1 | Status: SHIPPED | OUTPATIENT
Start: 2021-12-19 | End: 2022-07-05

## 2022-01-14 DIAGNOSIS — R51.9 NONINTRACTABLE EPISODIC HEADACHE, UNSPECIFIED HEADACHE TYPE: Chronic | ICD-10-CM

## 2022-01-14 RX ORDER — BUTALBITAL, ACETAMINOPHEN, CAFFEINE AND CODEINE PHOSPHATE 50; 325; 40; 30 MG/1; MG/1; MG/1; MG/1
CAPSULE ORAL
Qty: 30 CAPSULE | Refills: 2 | Status: SHIPPED | OUTPATIENT
Start: 2022-01-14 | End: 2022-03-17

## 2022-02-14 NOTE — TELEPHONE ENCOUNTER
Patient is calling you back to let you know it was the Doxycycline and Mupirocin ointment that were prescribed the last time. 98.2

## 2022-02-25 ENCOUNTER — OFFICE VISIT (OUTPATIENT)
Dept: INTERNAL MEDICINE CLINIC | Age: 47
End: 2022-02-25
Payer: COMMERCIAL

## 2022-02-25 VITALS
HEART RATE: 73 BPM | DIASTOLIC BLOOD PRESSURE: 63 MMHG | HEIGHT: 66 IN | BODY MASS INDEX: 23.63 KG/M2 | SYSTOLIC BLOOD PRESSURE: 109 MMHG | OXYGEN SATURATION: 100 % | WEIGHT: 147 LBS | RESPIRATION RATE: 19 BRPM | TEMPERATURE: 97 F

## 2022-02-25 DIAGNOSIS — Z01.419 WELL WOMAN EXAM WITH ROUTINE GYNECOLOGICAL EXAM: ICD-10-CM

## 2022-02-25 DIAGNOSIS — Z13.31 POSITIVE DEPRESSION SCREENING: ICD-10-CM

## 2022-02-25 DIAGNOSIS — E78.5 DYSLIPIDEMIA: ICD-10-CM

## 2022-02-25 DIAGNOSIS — Z11.59 NEED FOR HEPATITIS C SCREENING TEST: ICD-10-CM

## 2022-02-25 DIAGNOSIS — Z13.0 SCREENING FOR DEFICIENCY ANEMIA: ICD-10-CM

## 2022-02-25 DIAGNOSIS — G89.29 CHRONIC MIDLINE THORACIC BACK PAIN: ICD-10-CM

## 2022-02-25 DIAGNOSIS — M25.511 CHRONIC PAIN OF BOTH SHOULDERS: ICD-10-CM

## 2022-02-25 DIAGNOSIS — Z00.00 ROUTINE GENERAL MEDICAL EXAMINATION AT A HEALTH CARE FACILITY: Primary | ICD-10-CM

## 2022-02-25 DIAGNOSIS — Z72.0 TOBACCO USE: ICD-10-CM

## 2022-02-25 DIAGNOSIS — Z13.1 SCREENING FOR DIABETES MELLITUS: ICD-10-CM

## 2022-02-25 DIAGNOSIS — M54.50 CHRONIC LOW BACK PAIN, UNSPECIFIED BACK PAIN LATERALITY, UNSPECIFIED WHETHER SCIATICA PRESENT: ICD-10-CM

## 2022-02-25 DIAGNOSIS — M54.6 CHRONIC MIDLINE THORACIC BACK PAIN: ICD-10-CM

## 2022-02-25 DIAGNOSIS — G89.29 CHRONIC PAIN OF BOTH SHOULDERS: ICD-10-CM

## 2022-02-25 DIAGNOSIS — Z53.20 SCREENING FOR HEPATITIS C DECLINED: ICD-10-CM

## 2022-02-25 DIAGNOSIS — M54.2 NECK PAIN: ICD-10-CM

## 2022-02-25 DIAGNOSIS — M25.512 CHRONIC PAIN OF BOTH SHOULDERS: ICD-10-CM

## 2022-02-25 DIAGNOSIS — Z13.89 SCREENING FOR GENITOURINARY CONDITION: ICD-10-CM

## 2022-02-25 DIAGNOSIS — N64.4 BREAST PAIN: ICD-10-CM

## 2022-02-25 DIAGNOSIS — G89.29 CHRONIC LOW BACK PAIN, UNSPECIFIED BACK PAIN LATERALITY, UNSPECIFIED WHETHER SCIATICA PRESENT: ICD-10-CM

## 2022-02-25 PROCEDURE — 99396 PREV VISIT EST AGE 40-64: CPT | Performed by: INTERNAL MEDICINE

## 2022-02-25 RX ORDER — ARIPIPRAZOLE 5 MG/1
5 TABLET ORAL DAILY
Qty: 90 TABLET | Refills: 1 | Status: SHIPPED | OUTPATIENT
Start: 2022-02-25

## 2022-02-25 NOTE — PROGRESS NOTES
1. \"Have you been to the ER, urgent care clinic since your last visit? Hospitalized since your last visit? \" Yes [address] [facility] for [facility] 12/2021    2. \"Have you seen or consulted any other health care providers outside of the [address] [place] [address] since your last visit? \" No     3. For patients aged 39-70: Has the patient had a colonoscopy / FIT/ Cologuard? NA - based on age      If the patient is female:    4. For patients aged 41-77: Has the patient had a mammogram within the past 2 years? No      5. For patients aged 21-65: Has the patient had a pap smear? No    Patient declines the influenza, COVID-19 and Pneumococal vaccines at this time.
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
## 2022-02-26 LAB
ALBUMIN SERPL-MCNC: 4.6 G/DL (ref 3.8–4.8)
ALBUMIN/GLOB SERPL: 1.9 {RATIO} (ref 1.2–2.2)
ALP SERPL-CCNC: 73 IU/L (ref 44–121)
ALT SERPL-CCNC: 15 IU/L (ref 0–32)
APPEARANCE UR: CLEAR
AST SERPL-CCNC: 15 IU/L (ref 0–40)
BACTERIA #/AREA URNS HPF: ABNORMAL /[HPF]
BASOPHILS # BLD AUTO: 0 X10E3/UL (ref 0–0.2)
BASOPHILS NFR BLD AUTO: 1 %
BILIRUB SERPL-MCNC: <0.2 MG/DL (ref 0–1.2)
BILIRUB UR QL STRIP: NEGATIVE
BUN SERPL-MCNC: 10 MG/DL (ref 6–24)
BUN/CREAT SERPL: 15 (ref 9–23)
CALCIUM SERPL-MCNC: 9.3 MG/DL (ref 8.7–10.2)
CASTS URNS QL MICRO: ABNORMAL /LPF
CHLORIDE SERPL-SCNC: 99 MMOL/L (ref 96–106)
CHOLEST SERPL-MCNC: 271 MG/DL (ref 100–199)
CO2 SERPL-SCNC: 25 MMOL/L (ref 20–29)
COLOR UR: YELLOW
CREAT SERPL-MCNC: 0.68 MG/DL (ref 0.57–1)
EOSINOPHIL # BLD AUTO: 0.1 X10E3/UL (ref 0–0.4)
EOSINOPHIL NFR BLD AUTO: 1 %
EPI CELLS #/AREA URNS HPF: ABNORMAL /HPF (ref 0–10)
ERYTHROCYTE [DISTWIDTH] IN BLOOD BY AUTOMATED COUNT: 12.2 % (ref 11.7–15.4)
GLOBULIN SER CALC-MCNC: 2.4 G/DL (ref 1.5–4.5)
GLUCOSE SERPL-MCNC: 92 MG/DL (ref 65–99)
GLUCOSE UR QL STRIP: NEGATIVE
HCT VFR BLD AUTO: 37.1 % (ref 34–46.6)
HCV AB S/CO SERPL IA: <0.1 S/CO RATIO (ref 0–0.9)
HCV AB SERPL QL IA: NORMAL
HDLC SERPL-MCNC: 65 MG/DL
HGB BLD-MCNC: 12.5 G/DL (ref 11.1–15.9)
HGB UR QL STRIP: NEGATIVE
IMM GRANULOCYTES # BLD AUTO: 0 X10E3/UL (ref 0–0.1)
IMM GRANULOCYTES NFR BLD AUTO: 0 %
IMP & REVIEW OF LAB RESULTS: NORMAL
KETONES UR QL STRIP: NEGATIVE
LDLC SERPL CALC-MCNC: 184 MG/DL (ref 0–99)
LEUKOCYTE ESTERASE UR QL STRIP: ABNORMAL
LYMPHOCYTES # BLD AUTO: 2.4 X10E3/UL (ref 0.7–3.1)
LYMPHOCYTES NFR BLD AUTO: 29 %
MCH RBC QN AUTO: 32.6 PG (ref 26.6–33)
MCHC RBC AUTO-ENTMCNC: 33.7 G/DL (ref 31.5–35.7)
MCV RBC AUTO: 97 FL (ref 79–97)
MICRO URNS: ABNORMAL
MONOCYTES # BLD AUTO: 0.6 X10E3/UL (ref 0.1–0.9)
MONOCYTES NFR BLD AUTO: 8 %
NEUTROPHILS # BLD AUTO: 4.9 X10E3/UL (ref 1.4–7)
NEUTROPHILS NFR BLD AUTO: 61 %
NITRITE UR QL STRIP: NEGATIVE
PH UR STRIP: 7 [PH] (ref 5–7.5)
PLATELET # BLD AUTO: 325 X10E3/UL (ref 150–450)
POTASSIUM SERPL-SCNC: 4.3 MMOL/L (ref 3.5–5.2)
PROT SERPL-MCNC: 7 G/DL (ref 6–8.5)
PROT UR QL STRIP: NEGATIVE
RBC # BLD AUTO: 3.83 X10E6/UL (ref 3.77–5.28)
RBC #/AREA URNS HPF: ABNORMAL /HPF (ref 0–2)
SODIUM SERPL-SCNC: 138 MMOL/L (ref 134–144)
SP GR UR STRIP: <=1.005 (ref 1–1.03)
TRIGL SERPL-MCNC: 125 MG/DL (ref 0–149)
UROBILINOGEN UR STRIP-MCNC: 0.2 MG/DL (ref 0.2–1)
VLDLC SERPL CALC-MCNC: 22 MG/DL (ref 5–40)
WBC # BLD AUTO: 8.1 X10E3/UL (ref 3.4–10.8)
WBC #/AREA URNS HPF: ABNORMAL /HPF (ref 0–5)

## 2022-03-01 LAB
CYTOLOGIST CVX/VAG CYTO: NORMAL
CYTOLOGY CVX/VAG DOC CYTO: NORMAL
CYTOLOGY CVX/VAG DOC THIN PREP: NORMAL
DX ICD CODE: NORMAL
HPV I/H RISK 4 DNA CVX QL PROBE+SIG AMP: NEGATIVE
Lab: NORMAL
OTHER STN SPEC: NORMAL
STAT OF ADQ CVX/VAG CYTO-IMP: NORMAL

## 2022-03-08 ENCOUNTER — HOSPITAL ENCOUNTER (OUTPATIENT)
Dept: PHYSICAL THERAPY | Age: 47
Discharge: HOME | End: 2022-03-08
Payer: COMMERCIAL

## 2022-03-08 PROCEDURE — 97110 THERAPEUTIC EXERCISES: CPT

## 2022-03-08 PROCEDURE — 97140 MANUAL THERAPY 1/> REGIONS: CPT

## 2022-03-08 PROCEDURE — 97162 PT EVAL MOD COMPLEX 30 MIN: CPT

## 2022-03-08 NOTE — PROGRESS NOTES
PHYSICAL THERAPY - DAILY TREATMENT NOTE    Patient Name: Carlos Sensor        Date: 3/8/2022  : 1975   yes Patient  Verified  Visit #:      8  Insurance: Payor: Keny Eng / Plan: Kena Bazzi / Product Type: HMO /      In time: 1:02 Out time: 1:53   Total Treatment Time: 51     Medicare/[facility] Time Tracking (below)   Total Timed Codes (min):  25 1:1 Treatment Time:  51     TREATMENT AREA =  Neck pain [M54.2]  Chronic pain of both shoulders [M25.511, G89.29, M25.512]  Chronic midline thoracic back pain [M54.6, G89.29]    SUBJECTIVE  Pain Level (on 0 to 10 scale):  3  / 10   Medication Changes/New allergies or changes in medical history, any new surgeries or procedures?    no  If yes, update Summary List   Subjective Functional Status/Changes:  []  No changes reported     See POC          OBJECTIVE  15 min Therapeutic Exercise:  [x]  See flow sheet   Rationale:      increase ROM and increase strength to improve the patients ability to complete ADLs     10 min Manual Therapy: STM to B UT, lev scap, c/s paraspinals, R SCM light TPR, SOR, upper c/s flexion PROM   Rationale:      decrease pain, increase ROM, increase tissue extensibility and decrease trigger points to improve patient's ability to complete ADLs  The manual therapy interventions were performed at a separate and distinct time from the therapeutic activities interventions.     Billed With/As:   [x] TE   [] TA   [] Neuro   [] Self Care Patient Education: [x] Review HEP    [] Progressed/Changed HEP based on:   [x] positioning   [x] body mechanics   [] transfers   [x] heat/ice application    [] other:      Other Objective/Functional Measures:    See POC     Post Treatment Pain Level (on 0 to 10) scale:   3   10     ASSESSMENT  Assessment/Changes in Function:     See POC     []  See Progress Note/Recertification   Patient will continue to benefit from skilled PT services to modify and progress therapeutic interventions, address functional mobility deficits, address ROM deficits, address strength deficits, analyze and address soft tissue restrictions, analyze and cue movement patterns, analyze and modify body mechanics/ergonomics, assess and modify postural abnormalities and instruct in home and community integration to attain remaining goals.    Progress toward goals / Updated goals:    See POC     PLAN  []  Upgrade activities as tolerated yes Continue plan of care   []  Discharge due to :    []  Other:      Therapist: Yossi Morris PT    Date: 3/8/2022 Time: 12:07 PM     Future Appointments   Date Time Provider Jenny Lockwood   3/8/2022  1:00 PM Daxa Rockwell, PT [facility]   3/29/2022  8:30 AM [address] [facility] [address]
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
## 2022-03-17 ENCOUNTER — HOSPITAL ENCOUNTER (OUTPATIENT)
Dept: PHYSICAL THERAPY | Age: 47
Discharge: HOME | End: 2022-03-17
Payer: COMMERCIAL

## 2022-03-17 ENCOUNTER — PATIENT MESSAGE (OUTPATIENT)
Dept: INTERNAL MEDICINE CLINIC | Age: 47
End: 2022-03-17

## 2022-03-17 DIAGNOSIS — R51.9 NONINTRACTABLE EPISODIC HEADACHE, UNSPECIFIED HEADACHE TYPE: Chronic | ICD-10-CM

## 2022-03-17 PROCEDURE — 97140 MANUAL THERAPY 1/> REGIONS: CPT

## 2022-03-17 PROCEDURE — 97110 THERAPEUTIC EXERCISES: CPT

## 2022-03-17 RX ORDER — BUTALBITAL, ACETAMINOPHEN, CAFFEINE AND CODEINE PHOSPHATE 50; 325; 40; 30 MG/1; MG/1; MG/1; MG/1
CAPSULE ORAL
Qty: 30 CAPSULE | Refills: 5 | Status: SHIPPED | OUTPATIENT
Start: 2022-03-17 | End: 2022-07-25

## 2022-03-17 RX ORDER — METHOCARBAMOL 750 MG/1
750 TABLET, FILM COATED ORAL
Qty: 60 TABLET | Refills: 2 | Status: SHIPPED | OUTPATIENT
Start: 2022-03-17

## 2022-03-17 NOTE — PROGRESS NOTES
PHYSICAL THERAPY - DAILY TREATMENT NOTE    Patient Name: Jacki Martins        Date: 3/17/2022  : 1975   no Patient  Verified  Visit #:   2   of   8  Insurance: Payor: Celena Mix / Plan: Tomas Virgen / Product Type: HMO /      In time: 10:52 Out time: 11:46   Total Treatment Time: 54     Medicare/[employer] Time Tracking (below)   Total Timed Codes (min):  44 1:1 Treatment Time:  44     TREATMENT AREA =  Neck pain [M54.2]  Chronic pain of both shoulders [M25.511, G89.29, M25.512]  Chronic midline thoracic back pain [M54.6, G89.29]    SUBJECTIVE  Pain Level (on 0 to 10 scale):  3  / 10   Medication Changes/New allergies or changes in medical history, any new surgeries or procedures?    no  If yes, update Summary List   Subjective Functional Status/Changes:  []  No changes reported     I feel most of the pain in both sides of my neck pretty much equally more so than my lower back and I have a headache pretty much all of the time.            OBJECTIVE  Modalities Rationale:     decrease pain and increase tissue extensibility to improve patient's ability to improve functional mobility    min [] Estim, type/location:                                      []  att     []  unatt     []  w/US     []  w/ice    []  w/heat    min []  Mechanical Traction: type/lbs                   []  pro   []  sup   []  int   []  cont    []  before manual    []  after manual    min []  Ultrasound, settings/location:      min []  Iontophoresis w/ dexamethasone, location:                                               []  take home patch       []  in clinic   10 min []  Ice     [x]  Heat    location/position: Cervical/lumbar in supine     min []  Vasopneumatic Device, press/temp:    If using vaso (only need to measure limb vaso being performed on)      pre-treatment girth :       post-treatment girth :       measured at (landmark location) :    Vasopnuematic compression justification:  Per bilateral girth measures taken and listed above the edema is considered significant and having an impact on the patient's     min []  Other:    [x] Skin assessment post-treatment (if applicable):    []  intact    [x]  redness- no adverse reaction                  []redness - adverse reaction:      26  1:1 min Therapeutic Exercise:  [x]  See flow sheet   Rationale:      increase ROM and increase strength to improve the patients ability to improve functional abilities      18  1:1 min Manual Therapy: SOR, upper cervical releases, manual bilateral upper trap stretching with overpressure and STM/tissue mobs to bilateral cervical musculature in sitting    Rationale:      decrease pain, increase ROM, increase tissue extensibility, decrease trigger points and increase postural awareness to improve patient's ability to improve functional mobility   The manual therapy interventions were performed at a separate and distinct time from the therapeutic activities interventions. Billed With/As:   [] TE   [] TA   [] Neuro   [] Self Care Patient Education: [x] Review HEP    [] Progressed/Changed HEP based on:   [] positioning   [] body mechanics   [] transfers   [] heat/ice application    [] other:      Other Objective/Functional Measures:  Verbal cueing for proper form/technique with all new exercises during treatment today     Post Treatment Pain Level (on 0 to 10) scale:   1  / 10     ASSESSMENT  Assessment/Changes in Function:   Pt tolerated all new therex fairly well upon trial today with chief c/o equal bilateral cervical > lumbar pain/symptoms. Pt presented with increased palpable tension especially in sub occipital and lateral cervical/SCM regions bilaterally with manual intervention today. Pt needs the most focus on decreased cervical tension/stresses as well as postural strengthening, cervicothoracic and lumbopelvic/core stabilization. Will continue to progress/advance patient within current POC as tolerated with monitoring symptoms.      []  See Progress Note/Recertification   Patient will continue to benefit from skilled PT services to modify and progress therapeutic interventions, address functional mobility deficits, address ROM deficits, address strength deficits, analyze and address soft tissue restrictions, analyze and cue movement patterns, analyze and modify body mechanics/ergonomics, assess and modify postural abnormalities and instruct in home and community integration to attain remaining goals. Progress toward goals / Updated goals: · Short Term Goals: To be accomplished in  2  weeks:  1. Pt will be I and compliant with HEP for self management of sx. 3/17/22: Met, also updated,issued and reviewed HEP  2. Improve c/s flexion AROM by at least 10 deg to improve ease of looking down  3. Pt will demo good TA contraction without compensation to progress dynamic core stability exercise. · Long Term Goals: To be accomplished in  4  weeks:  1. Pt will maintain deep c/s endurance test x 21\" w/o compensation to improve c/s stability for ADLs  2. Pt will perform supine bridge to 100% AROM x 10 reps to improve ease of bed mobility and transfers  3. Improve FOTO score to >/= 60/100 to indicate improved function  4. Pt will report at least 70% overall improvement in sx in order to return to premorbid status.       PLAN  [x]  Upgrade activities as tolerated yes Continue plan of care   []  Discharge due to :    []  Other:      Therapist: Mallika Breaux PTA    Date: 3/17/2022 Time: 10:50 AM     Future Appointments   Date Time Provider Jenny Lockwood   3/23/2022  1:30 PM [facility] PT [place] 1 [facility]   3/29/2022  8:30 AM [facility] [facility] [address] [facility]   3/30/2022 11:45 AM Benny Hayes PTA [facility]   4/6/2022 11:45 AM Benny Hayes PTA [facility]   4/13/2022 11:45 AM Benny Hayes PTA [facility]

## 2022-03-17 NOTE — TELEPHONE ENCOUNTER
From: Mina Harada  To: Phyllis Warren MD  Sent: 3/17/2022 6:16 AM EDT  Subject: Methocarbamol    The prescription you prescribed has 4 refills but its . Can you refill it? Looks like the methocarbamol that I received from the ER is now the one on my medication list and not the one you prescribed.

## 2022-03-23 ENCOUNTER — HOSPITAL ENCOUNTER (OUTPATIENT)
Dept: PHYSICAL THERAPY | Age: 47
Discharge: HOME | End: 2022-03-23
Payer: COMMERCIAL

## 2022-03-23 PROCEDURE — 97140 MANUAL THERAPY 1/> REGIONS: CPT

## 2022-03-23 PROCEDURE — 97110 THERAPEUTIC EXERCISES: CPT

## 2022-03-23 NOTE — PROGRESS NOTES
PHYSICAL THERAPY - DAILY TREATMENT NOTE    Patient Name: Breanne Lyn        Date: 3/23/2022  : 1975   no Patient  Verified  Visit #:   3   of   8  Insurance: Payor: Barber Montgomery / Plan: Linda Clarke / Product Type: HMO /      In time: 1:28 Out time: 2:27   Total Treatment Time: 59     Medicare/BCBS Time Tracking (below)   Total Timed Codes (min):  49 1:1 Treatment Time: 49     TREATMENT AREA =  Neck pain [M54.2]  Chronic pain of both shoulders [M25.511, G89.29, M25.512]  Chronic midline thoracic back pain [M54.6, G89.29]    SUBJECTIVE  Pain Level (on 0 to 10 scale):  3  / 10   Medication Changes/New allergies or changes in medical history, any new surgeries or procedures?    no  If yes, update Summary List   Subjective Functional Status/Changes:  []  No changes reported     \"Pain is about average today and all in my neck. My low back is doing fine. Doing my home exercises every other day. \"       OBJECTIVE  Modalities Rationale:     decrease pain and increase tissue extensibility to improve patient's ability to improve functional mobility    min [] Estim, type/location:                                      []  att     []  unatt     []  w/US     []  w/ice    []  w/heat    min []  Mechanical Traction: type/lbs                   []  pro   []  sup   []  int   []  cont    []  before manual    []  after manual    min []  Ultrasound, settings/location:      min []  Iontophoresis w/ dexamethasone, location:                                               []  take home patch       []  in clinic   10 min []  Ice     [x]  Heat    location/position: Cervical/lumbar in supine     min []  Vasopneumatic Device, press/temp:    If using vaso (only need to measure limb vaso being performed on)      pre-treatment girth :       post-treatment girth :       measured at (landmark location) :    Vasopnuematic compression justification:  Per bilateral girth measures taken and listed above the edema is considered significant and having an impact on the patient's     min []  Other:    [x] Skin assessment post-treatment (if applicable):    []  intact    [x]  redness- no adverse reaction                  []redness - adverse reaction:      32 min Therapeutic Exercise:  [x]  See flow sheet   Rationale:      increase ROM and increase strength to improve the patients ability to improve functional abilities      17 min Manual Therapy: In Supine: STM to B UT, lev scap, c/s paraspinals, R SCM light TPR, SOR, upper c/s flexion PROM. Manual B UT stretching with overpressure. Rationale:      decrease pain, increase ROM, increase tissue extensibility, decrease trigger points and increase postural awareness to improve patient's ability to improve functional mobility   The manual therapy interventions were performed at a separate and distinct time from the therapeutic activities interventions. Billed With/As:   [x] TE   [] TA   [] Neuro   [] Self Care Patient Education: [x] Review HEP    [] Progressed/Changed HEP based on:   [] positioning   [] body mechanics   [] transfers   [] heat/ice application    [] other:      Other Objective/Functional Measures:    Inc Tone to L>R UT, TTP to B c/s paraspinals, scalenes, SCM, suboccipital triangle. Added chin tuck, SLR with TA and SL hip abd. Post Treatment Pain Level (on 0 to 10) scale:   0  / 10     ASSESSMENT  Assessment/Changes in Function:     Cueing for correct form with addition of new TE. Pt tolerated today's tx session well with c/c of neck pain.  Presents with notable increased tone, L>R UT and into sub occipital region but responding well to TE and MT; no c/o pain end of Tx.     []  See Progress Note/Recertification   Patient will continue to benefit from skilled PT services to modify and progress therapeutic interventions, address functional mobility deficits, address ROM deficits, address strength deficits, analyze and address soft tissue restrictions, analyze and cue movement patterns, analyze and modify body mechanics/ergonomics, assess and modify postural abnormalities and instruct in home and community integration to attain remaining goals. Progress toward goals / Updated goals: · Short Term Goals: To be accomplished in  2  weeks:  1. Pt will be I and compliant with HEP for self management of sx. 3/17/22: Met, also updated,issued and reviewed HEP  2. Improve c/s flexion AROM by at least 10 deg to improve ease of looking down  3. Pt will demo good TA contraction without compensation to progress dynamic core stability exercise. · Long Term Goals: To be accomplished in  4  weeks:  1. Pt will maintain deep c/s endurance test x 21\" w/o compensation to improve c/s stability for ADLs  2. Pt will perform supine bridge to 100% AROM x 10 reps to improve ease of bed mobility and transfers  3. Improve FOTO score to >/= 60/100 to indicate improved function  4. Pt will report at least 70% overall improvement in sx in order to return to premorbid status.       PLAN  [x]  Upgrade activities as tolerated yes Continue plan of care   []  Discharge due to :    []  Other:      Therapist: Joanna Zendejas PTA    Date: 3/23/2022 Time: 10:50 AM     Future Appointments   Date Time Provider Jenny Lockwood   3/23/2022  1:30 PM [facility] [place] [facility]   3/29/2022  8:30 AM [facility] KAYLEIGH RM [address] [facility] [address]   3/30/2022 11:45 AM Benny Hayes PTA [facility]   4/6/2022 11:45 AM Benny Hayes PTA [facility]   4/13/2022 11:45 AM Benny Hayes PTA [facility]

## 2022-03-28 ENCOUNTER — HOSPITAL ENCOUNTER (OUTPATIENT)
Dept: PHYSICAL THERAPY | Age: 47
Discharge: HOME | End: 2022-03-28
Payer: COMMERCIAL

## 2022-03-28 PROCEDURE — 97110 THERAPEUTIC EXERCISES: CPT

## 2022-03-28 PROCEDURE — 97140 MANUAL THERAPY 1/> REGIONS: CPT

## 2022-03-28 NOTE — PROGRESS NOTES
PHYSICAL THERAPY - DAILY TREATMENT NOTE    Patient Name: Mitali Burchllor        Date: 3/28/2022  : 1975   no Patient  Verified  Visit #:   4   of   8  Insurance: Payor: Teri Face / Plan: Britt Deniseil / Product Type: HMO /      In time: 1:04 Out time: 2:14   Total Treatment Time: 70     Medicare/BCBS Time Tracking (below)   Total Timed Codes (min):  60 1:1 Treatment Time:  43     TREATMENT AREA =  Neck pain [M54.2]  Chronic pain of both shoulders [M25.511, G89.29, M25.512]  Chronic midline thoracic back pain [M54.6, G89.29]    SUBJECTIVE  Pain Level (on 0 to 10 scale):  7  / 10   Medication Changes/New allergies or changes in medical history, any new surgeries or procedures?    no  If yes, update Summary List   Subjective Functional Status/Changes:  []  No changes reported     I have had more pain in my neck and going down to my shoulder blades and right arm since yesterday, but I don't remember doing anything different that would have flared it up, I was just doing my normal stuff.             OBJECTIVE  Modalities Rationale:     decrease pain and increase tissue extensibility to improve patient's ability to improve functional abilities    min [] Estim, type/location:                                      []  att     []  unatt     []  w/US     []  w/ice    []  w/heat    min []  Mechanical Traction: type/lbs                   []  pro   []  sup   []  int   []  cont    []  before manual    []  after manual    min []  Ultrasound, settings/location:      min []  Iontophoresis w/ dexamethasone, location:                                               []  take home patch       []  in clinic   10 min []  Ice     [x]  Heat    location/position: Cervical/lumbar in supine     min []  Vasopneumatic Device, press/temp:    If using vaso (only need to measure limb vaso being performed on)      pre-treatment girth :       post-treatment girth :       measured at (landmark location) :    Vasopnuematic compression justification:  Per bilateral girth measures taken and listed above the edema is considered significant and having an impact on the patient's     min []  Other:    [] Skin assessment post-treatment (if applicable):    []  intact    []  redness- no adverse reaction                  []redness - adverse reaction:      40  23  1:1 min Therapeutic Exercise:  [x]  See flow sheet   Rationale:      increase ROM and increase strength to improve the patients ability to improve functional abilities      20  1:1 min Manual Therapy: SOR, upper cervical releases, cervical P>A and side glide mobs, manual bilateral upper trap stretching with overpressure and STM/tissue mobs to bilateral cervical musculature in sitting    Rationale:      decrease pain, increase ROM, increase tissue extensibility, decrease trigger points and increase postural awareness to improve patient's ability to improve functional mobility   The manual therapy interventions were performed at a separate and distinct time from the therapeutic activities interventions. Billed With/As:   [] TE   [] TA   [] Neuro   [] Self Care Patient Education: [x] Review HEP    [] Progressed/Changed HEP based on:   [] positioning   [] body mechanics   [] transfers   [] heat/ice application    [] other:      Other Objective/Functional Measures:  Verbal cueing for proper form/technique with various exercises during treatment today  Addition of corkscrew stretches to program today   Post Treatment Pain Level (on 0 to 10) scale:   4  / 10     ASSESSMENT  Assessment/Changes in Function:   Pt presented with chief c/o increased cervical pain/symptoms down to bilateral medial scapular and R UE radicular symptoms down to proximal half of UE with no specific change/increase in activity since last treatment. Pt does report short term relief for approximately 1-2 days after treatment until symptoms return to the same level over the past 2 visits.  Pt did report increased relief and was able to tolerate full normal therex regiment with performing manual intervention at the beginning of treatment before therex today. Will continue to progress/advance patient within current POC as tolerated with monitoring symptoms. []  See Progress Note/Recertification   Patient will continue to benefit from skilled PT services to modify and progress therapeutic interventions, address functional mobility deficits, address ROM deficits, address strength deficits, analyze and address soft tissue restrictions, analyze and cue movement patterns, analyze and modify body mechanics/ergonomics, assess and modify postural abnormalities and instruct in home and community integration to attain remaining goals. Progress toward goals / Updated goals: · Short Term Goals: To be accomplished in  2  weeks:  1. Pt will be I and compliant with HEP for self management of sx. 3/17/22: Met, also updated,issued and reviewed HEP  2. Improve c/s flexion AROM by at least 10 deg to improve ease of looking down 3/28/22: Not Met, Cervical flexion AROM= 25 deg (25 deg at IE)  3. Pt will demo good TA contraction without compensation to progress dynamic core stability exercise. · Long Term Goals: To be accomplished in  4  weeks:  1. Pt will maintain deep c/s endurance test x 21\" w/o compensation to improve c/s stability for ADLs  2. Pt will perform supine bridge to 100% AROM x 10 reps to improve ease of bed mobility and transfers  3. Improve FOTO score to >/= 60/100 to indicate improved function  4. Pt will report at least 70% overall improvement in sx in order to return to premorbid status.       PLAN  [x]  Upgrade activities as tolerated yes Continue plan of care   []  Discharge due to :    []  Other:      Therapist: Omega Palomo PTA    Date: 3/28/2022 Time: 1:06 PM     Future Appointments   Date Time Provider Jenny Lockwood   3/29/2022  8:30 AM [facility] 1 [address]   4/6/2022 11:45 AM Sammi Zeng PTA [facility]   4/13/2022 11:45 AM Lennox Ort, PTA [facility]

## 2022-03-29 ENCOUNTER — HOSPITAL ENCOUNTER (OUTPATIENT)
Dept: WOMENS IMAGING | Age: 47
Discharge: HOME | End: 2022-03-29
Attending: INTERNAL MEDICINE
Payer: COMMERCIAL

## 2022-03-29 ENCOUNTER — HOSPITAL ENCOUNTER (OUTPATIENT)
Dept: ULTRASOUND IMAGING | Age: 47
Discharge: HOME | End: 2022-03-29
Attending: INTERNAL MEDICINE
Payer: COMMERCIAL

## 2022-03-29 DIAGNOSIS — N63.10 LUMP OF BREAST, RIGHT: ICD-10-CM

## 2022-03-29 DIAGNOSIS — N64.4 BREAST PAIN: ICD-10-CM

## 2022-03-29 PROCEDURE — 77062 BREAST TOMOSYNTHESIS BI: CPT

## 2022-03-29 PROCEDURE — 76642 ULTRASOUND BREAST LIMITED: CPT

## 2022-03-30 ENCOUNTER — APPOINTMENT (OUTPATIENT)
Dept: PHYSICAL THERAPY | Age: 47
End: 2022-03-30
Payer: COMMERCIAL

## 2022-04-06 ENCOUNTER — HOSPITAL ENCOUNTER (OUTPATIENT)
Dept: PHYSICAL THERAPY | Age: 47
Discharge: HOME | End: 2022-04-06
Payer: COMMERCIAL

## 2022-04-06 PROCEDURE — 97110 THERAPEUTIC EXERCISES: CPT

## 2022-04-06 PROCEDURE — 97140 MANUAL THERAPY 1/> REGIONS: CPT

## 2022-04-06 PROCEDURE — 97530 THERAPEUTIC ACTIVITIES: CPT

## 2022-04-06 NOTE — PROGRESS NOTES
[address] [place] [address] PHYSICAL THERAPY  [facility], [place], [address] - Phone: [phone]  Fax: [phone]  PROGRESS NOTE  Patient Name: Iggy Costa : 1975   Treatment/Medical Diagnosis: Neck pain [M54.2]  Chronic pain of both shoulders [M25.511, G89.29, M25.512]  Chronic midline thoracic back pain [M54.6, G89.29]   Referral Source: Lillian Hastings MD     Date of Initial Visit: 3/8/22 Attended Visits: 5 Missed Visits: 0     SUMMARY OF TREATMENT  Therapeutic exercise for cervical/lumbar mobility, postural awareness/strengthening, cervicothoracic/lumbopelvic/core stabilization, manual intervention, moist heat, patient education and HEP. CURRENT STATUS  Pt reports 50% overall improvement in sx since beginning care. Pt reports 8/10 max pain, 2/10 avg pain. Pain made worse with with prolonged sitting especially with computer use with work duties as well as over exertion with forward flexion, reaching and lifting type ADL's. Pt is making good progress with gaining cervical and lumbar mobility as well as advancing with strengthening and cervicothoracic/lumbopelvic/core stabilization within current POC. Improvements: Pt reports the most functional improvement with decreased frequency and intensity of pain/symptoms as well as increased cervical and lumbar mobility with ADL's since initial evaluation. Remaining functional limitations: Increased limitations/pain/symptoms with prolonged sitting especially with computer use with work duties as well as over exertion with forward flexion, reaching and lifting type ADL's. Objective Measurements:  Cervical AROM= Flexion= 43 deg; Extension= 58 deg; Side bending= 35 deg bilaterally; Rotation= R= 77 deg, L= 73 deg  Lumbar AROM= Flexion= full/WFL's; Extension= 50%; Side bending= R= 85%, L= 75%      Goal/Measure of Progress Goal Met? 1.   Improve c/s flexion AROM by at least 10 deg to improve ease of looking down Status at last Eval: flex 25 deg \"pull\" Current Status: Flex 43 deg yes   2. Pt will demo good TA contraction without compensation to progress dynamic core stability exercise. Status at last Eval: Fair- TA draw w/ upper trunk flexion compensation Current Status: Improved, Good visible TA contraction yes   3. Pt will maintain deep c/s endurance test x 21\" w/o compensation to improve c/s stability for ADLs   Status at last Eval: 12 seconds with increased compensation Current Status: 14 seconds with visible difficulty with endurance progress     Goal/Measure of Progress Goal Met? 4. Pt will perform supine bridge to 100% AROM x 10 reps to improve ease of bed mobility and transfers   Status at last Eval: supine bridge to 75% AROM Current Status: Met, able to perform supine bridge at 100% for 10 reps with good form yes   5. Improve FOTO score to >/= 60/100 to indicate improved function   Status at last Eval: 48/100 Current Status: Unable to assess due to Foto being down today no   6. Pt will report at least 70% overall improvement in sx in order to return to premorbid status. Status at last Eval: n/a Current Status: 50% improvement reported progress     New Goals to be achieved in __8__  treatments:  1. Pt will maintain deep c/s endurance test x 21\" w/o compensation to improve c/s stability for ADLs  2. Improve FOTO score to >/= 60/100 to indicate improved function  3. Pt will report at least 70% overall improvement in sx in order to return to premorbid status. 4. Pt will improve B shoulder flex/abd MMT by at least 1/3 grade to reduce pain with lifting activities (SOC 4-/5 B flex and abd)      RECOMMENDATIONS  Pt continues to demo poor postural endurance and dec functional strength. Pt would benefit from continuation of skilled services 2x/wk x 4 weeks to further address impairments and functional limitations to improve activity tolerance.     If you have any questions/comments please contact us directly at [phone]. Thank you for allowing us to assist in the care of your patient. Therapist Signature: Jose Sifuentes PTA Date: 4/6/2022    Margarito Rosario PT, DPT, CMTPT Time: 11:51 AM   NOTE TO PHYSICIAN:  PLEASE COMPLETE THE ORDERS BELOW AND FAX TO   [facility] Physical Therapy: [phone]  If you are unable to process this request in 24 hours please contact our office: [phone]    ___ I have read the above report and request that my patient continue as recommended.   ___ I have read the above report and request that my patient continue therapy with the following changes/special instructions:_________________________________________________________   ___ I have read the above report and request that my patient be discharged from therapy.      Physician Signature:        Date:       Time:       Emma Bull MD

## 2022-04-06 NOTE — PROGRESS NOTES
PHYSICAL THERAPY - DAILY TREATMENT NOTE    Patient Name: Carmen Hernandes        Date: 2022  : 1975   yes Patient  Verified  Visit #:   5   of   8  Insurance: Payor: Mayank Lunsford / Plan: Naima Oats / Product Type: HMO /      In time: 11:48 Out time: 12:55   Total Treatment Time: 67     Medicare/[facility] Time Tracking (below)   Total Timed Codes (min):  57 1:1 Treatment Time:  57     TREATMENT AREA =  Neck pain [M54.2]  Chronic pain of both shoulders [M25.511, G89.29, M25.512]  Chronic midline thoracic back pain [M54.6, G89.29]    SUBJECTIVE  Pain Level (on 0 to 10 scale):  2  / 10   Medication Changes/New allergies or changes in medical history, any new surgeries or procedures?    no  If yes, update Summary List   Subjective Functional Status/Changes:  []  No changes reported     My neck and back is actually feeling pretty good today, but I haven't really been doing anything over the past 6 days because I have been in bed sick, but I am feeling better.            OBJECTIVE  Modalities Rationale:     decrease pain and increase tissue extensibility to improve patient's ability to improve functional abilities    min [] Estim, type/location:                                      []  att     []  unatt     []  w/US     []  w/ice    []  w/heat    min []  Mechanical Traction: type/lbs                   []  pro   []  sup   []  int   []  cont    []  before manual    []  after manual    min []  Ultrasound, settings/location:      min []  Iontophoresis w/ dexamethasone, location:                                               []  take home patch       []  in clinic   10 min []  Ice     [x]  Heat    location/position: Lumbar/supine     min []  Vasopneumatic Device, press/temp:    If using vaso (only need to measure limb vaso being performed on)      pre-treatment girth :       post-treatment girth :       measured at (landmark location) :    Vasopnuematic compression justification:  Per bilateral girth measures taken and listed above the edema is considered significant and having an impact on the patient's     min []  Other:    [x] Skin assessment post-treatment (if applicable):    [x]  intact    [x]  redness- no adverse reaction                  []redness - adverse reaction:      37  1:1 min Therapeutic Exercise:  [x]  See flow sheet   Rationale:      increase ROM and increase strength to improve the patients ability to improve functional abilities      10  1:1 min Manual Therapy: SOR, upper cervical releases, cervical P>A and side glide mobs, manual bilateral upper trap stretching with overpressure    Rationale:      decrease pain, increase ROM, increase tissue extensibility, decrease trigger points and increase postural awareness to improve patient's ability to improve functional mobility   The manual therapy interventions were performed at a separate and distinct time from the therapeutic activities interventions. 10  1:1 min Therapeutic Activity: [x]  See flow sheet   Rationale: Through reassessment of all established goals including mobility and strength reassessment for progress note    Billed With/As:   [] TE   [] TA   [] Neuro   [] Self Care Patient Education: [x] Review HEP    [] Progressed/Changed HEP based on:   [] positioning   [] body mechanics   [] transfers   [] heat/ice application    [] other:      Other Objective/Functional Measures:       Post Treatment Pain Level (on 0 to 10) scale:   3  / 10     ASSESSMENT  Assessment/Changes in Function:   See Progress note/Physician update for full detailed progress towards established goals.      [x]  See Progress Note/Recertification   Patient will continue to benefit from skilled PT services to modify and progress therapeutic interventions, address functional mobility deficits, address ROM deficits, address strength deficits, analyze and address soft tissue restrictions, analyze and cue movement patterns, analyze and modify body mechanics/ergonomics, assess and modify postural abnormalities and instruct in home and community integration to attain remaining goals. Progress toward goals / Updated goals:  See Progress note/Physician update for full detailed progress towards established goals.      PLAN  [x]  Upgrade activities as tolerated yes Continue plan of care   []  Discharge due to :    []  Other:      Therapist: Orland Sicard, PTA    Date: 4/6/2022 Time: 11:43 AM     Future Appointments   Date Time Provider Jenny Lockwood   4/6/2022 11:45 AM Mayank Suazo PTA [facility]   4/13/2022 11:45 AM CHRISTINA Sims [facility]

## 2022-04-13 ENCOUNTER — HOSPITAL ENCOUNTER (OUTPATIENT)
Dept: PHYSICAL THERAPY | Age: 47
Discharge: HOME | End: 2022-04-13
Payer: COMMERCIAL

## 2022-04-13 PROCEDURE — 97110 THERAPEUTIC EXERCISES: CPT

## 2022-04-13 PROCEDURE — 97140 MANUAL THERAPY 1/> REGIONS: CPT

## 2022-04-13 NOTE — PROGRESS NOTES
PHYSICAL THERAPY - DAILY TREATMENT NOTE    Patient Name: Abi Turner        Date: 2022  : 1975   yes Patient  Verified  Visit #:     Insurance: Payor: Tiana Kanner / Plan: Leno Flores / Product Type: HMO /      In time: 11:47 Out time: 12:45   Total Treatment Time: 58     Medicare/[facility] Time Tracking (below)   Total Timed Codes (min):  58 1:1 Treatment Time:  58     TREATMENT AREA =  Neck pain [M54.2]  Chronic pain of both shoulders [M25.511, G89.29, M25.512]  Chronic midline thoracic back pain [M54.6, G89.29]    SUBJECTIVE  Pain Level (on 0 to 10 scale):   10   Medication Changes/New allergies or changes in medical history, any new surgeries or procedures?    no  If yes, update Summary List   Subjective Functional Status/Changes:  []  No changes reported     My neck is hurting a little bit more than usual and I have a headache today           OBJECTIVE    38  1:1 min Therapeutic Exercise:  [x]  See flow sheet   Rationale:      increase ROM and increase strength to improve the patients ability to improve functional abilities      20  1:1 min Manual Therapy: SOR, upper cervical releases, cervical P>A and side glide mobs, manual bilateral upper trap stretching with overpressure and STM/tissue mobs to bilateral cervical musculature in sitting   Rationale:      decrease pain, increase ROM, increase tissue extensibility, decrease trigger points and increase postural awareness to improve patient's ability to improve functional mobility   The manual therapy interventions were performed at a separate and distinct time from the therapeutic activities interventions.     Billed With/As:   [] TE   [] TA   [] Neuro   [] Self Care Patient Education: [x] Review HEP    [] Progressed/Changed HEP based on:   [] positioning   [] body mechanics   [] transfers   [] heat/ice application    [] other:      Other Objective/Functional Measures:  Verbal cueing for proper form/technique with various exercises during treatment today  Advanced to stability ball bridges and level 2 dead bug stabs with updated HEP to reflect these exercises as well   Post Treatment Pain Level (on 0 to 10) scale:   2 / 10     ASSESSMENT  Assessment/Changes in Function:   Pt presented with chief c/o increased cervical pain/symptoms as well as cervicogenic headache with no specific change/increase in activity since last treatment. Pt was able to tolerate full normal therex regiment including addition of stability ball bridges and level 2 dead bug stabs with min to mod challenge today. Pt did present with increased R>L upper trap tension with manual stretching, but increased L>R tissue tension/trigger points with STM/tissue mobs with manual intervention today. Will continue to progress/advance patient within current POC as tolerated with monitoring symptoms. []  See Progress Note/Recertification   Patient will continue to benefit from skilled PT services to modify and progress therapeutic interventions, address functional mobility deficits, address ROM deficits, address strength deficits, analyze and address soft tissue restrictions, analyze and cue movement patterns, analyze and modify body mechanics/ergonomics, assess and modify postural abnormalities and instruct in home and community integration to attain remaining goals. Progress toward goals / Updated goals:  New Goals to be achieved in __8__  treatments:  1. Pt will maintain deep c/s endurance test x 21\" w/o compensation to improve c/s stability for ADLs  2. Improve FOTO score to >/= 60/100 to indicate improved function  3. Pt will report at least 70% overall improvement in sx in order to return to premorbid status.    4. Pt will improve B shoulder flex/abd MMT by at least 1/3 grade to reduce pain with lifting activities ([facility] 4-/5 B flex and abd)     PLAN  [x]  Upgrade activities as tolerated yes Continue plan of care   []  Discharge due to :    []  Other:      Therapist: Claire Traore, PTA    Date: 4/13/2022 Time: 11:57 AM     Future Appointments   Date Time Provider Jenny Lockwood   4/20/2022  2:30 PM Merced Singleton [employer] [facility]   4/27/2022 11:45 AM Elvira Negron, PTA [facility]   5/4/2022 11:45 AM Elvira Negron, PTA [facility]   5/11/2022 11:45 AM Elvira Negron, PTA [facility]   5/18/2022 11:45 AM Elvira Negron, PTA [facility]   5/25/2022 11:45 AM Elvira Negron, PTA [facility]

## 2022-04-20 ENCOUNTER — HOSPITAL ENCOUNTER (OUTPATIENT)
Dept: PHYSICAL THERAPY | Age: 47
Discharge: HOME | End: 2022-04-20
Payer: COMMERCIAL

## 2022-04-20 PROCEDURE — 97140 MANUAL THERAPY 1/> REGIONS: CPT

## 2022-04-20 PROCEDURE — 97110 THERAPEUTIC EXERCISES: CPT

## 2022-04-20 NOTE — PROGRESS NOTES
PHYSICAL THERAPY - DAILY TREATMENT NOTE    Patient Name: Radha Kirkpatrick        Date: 2022  : 1975   yes Patient  Verified  Visit #:   7  of   13  Insurance: Payor: Hermilagrisel Kruger / Plan: Maria Fernanda Copas / Product Type: HMO /      In time: 2:32 Out time: 3:25p   Total Treatment Time: 53     Medicare/[employer] Time Tracking (below)   Total Timed Codes (min):  43 1:1 Treatment Time:  43     TREATMENT AREA =  Neck pain [M54.2]  Chronic pain of both shoulders [M25.511, G89.29, M25.512]  Chronic midline thoracic back pain [M54.6, G89.29]    SUBJECTIVE  Pain Level (on 0 to 10 scale):   10 neck 6/10 headache   Medication Changes/New allergies or changes in medical history, any new surgeries or procedures?    no  If yes, update Summary List   Subjective Functional Status/Changes:  []  No changes reported     Pt reports her neck is bothering her today, and her headache is worse. OBJECTIVE  Modalities Rationale:     decrease pain and increase tissue extensibility to improve patient's ability to perform ADLs   min [] Estim, type/location:                                      []  att     []  unatt     []  w/US     []  w/ice    []  w/heat    min []  Mechanical Traction: type/lbs                   []  pro   []  sup   []  int   []  cont    []  before manual    []  after manual    min []  Ultrasound, settings/location:      min []  Iontophoresis w/ dexamethasone, location:                                               []  take home patch       []  in clinic   10 min []  Ice     [x]  Heat    location/position:  To c spine in supine    min []  Vasopneumatic Device, press/temp:    If using vaso (only need to measure limb vaso being performed on)      pre-treatment girth :       post-treatment girth :       measured at (landmark location) :      min []  Other:    [] Skin assessment post-treatment (if applicable):    []  intact    []  redness- no adverse reaction                  []redness - adverse reaction: 30  1:1 min Therapeutic Exercise:  [x]  See flow sheet   Rationale:      increase ROM and increase strength to improve the patients ability to improve functional abilities      13  1:1 min Manual Therapy: SOR, manual upper trap and scalene stretching, stm to cervical paraspinals in supine   Rationale:      decrease pain, increase ROM, increase tissue extensibility, decrease trigger points and increase postural awareness to improve patient's ability to improve functional mobility   The manual therapy interventions were performed at a separate and distinct time from the therapeutic activities interventions. Billed With/As:   [] TE   [] TA   [] Neuro   [] Self Care Patient Education: [x] Review HEP    [] Progressed/Changed HEP based on:   [] positioning   [] body mechanics   [] transfers   [] heat/ice application    [] other:      Other Objective/Functional Measures: Added wall angels, doorway stretch, foam roller tspine mobilization, open books,    Post Treatment Pain Level (on 0 to 10) scale:   1 / 10     ASSESSMENT  Assessment/Changes in Function: Focus of tx today was on mid back mobility, dynamic scapular stability, and cervical stability as pt is having inc neck pain and head ache today. Pt required vc and demo with all new exercises to perform correctly. She was challenged with wall angels requiring vc to avoid lumbar/thoracic extension compensation. Shoulder exercises were also performed at wall to reinforce correct posture. Pt was also challenged with prone Ts requiring vc demo and tactile cues to retract scapula.     []  See Progress Note/Recertification   Patient will continue to benefit from skilled PT services to modify and progress therapeutic interventions, address functional mobility deficits, address ROM deficits, address strength deficits, analyze and address soft tissue restrictions, analyze and cue movement patterns, analyze and modify body mechanics/ergonomics, assess and modify postural abnormalities and instruct in home and community integration to attain remaining goals. Progress toward goals / Updated goals:  New Goals to be achieved in __8__  treatments:  1. Pt will maintain deep c/s endurance test x 21\" w/o compensation to improve c/s stability for ADLs  2. Improve FOTO score to >/= 60/100 to indicate improved function  3. Pt will report at least 70% overall improvement in sx in order to return to premorbid status.    4. Pt will improve B shoulder flex/abd MMT by at least 1/3 grade to reduce pain with lifting activities ([facility] 4-/5 B flex and abd)     PLAN  [x]  Upgrade activities as tolerated yes Continue plan of care   []  Discharge due to :    []  Other:      Therapist: Real Horowitz    Date: 4/20/2022 Time: 11:57 AM     Future Appointments   Date Time Provider Jenny Lockwood   4/20/2022  2:30 PM Ana Carl [facility]   4/27/2022 11:45 AM Lennox Ort, PTA [facility]   5/4/2022 11:45 AM Lennox Ort, PTA [facility]   5/11/2022 11:45 AM Lennox Ort, PTA [facility]   5/18/2022 11:45 AM Lennox Ort, PTA [facility]   5/25/2022 11:45 AM Lennox Ort, PTA [facility]

## 2022-04-27 ENCOUNTER — APPOINTMENT (OUTPATIENT)
Dept: PHYSICAL THERAPY | Age: 47
End: 2022-04-27
Payer: COMMERCIAL

## 2022-05-02 DIAGNOSIS — Z76.0 MEDICATION REFILL: ICD-10-CM

## 2022-05-02 RX ORDER — DIAZEPAM 10 MG/1
TABLET ORAL
Qty: 90 TABLET | Refills: 1 | Status: SHIPPED | OUTPATIENT
Start: 2022-05-02 | End: 2022-07-25

## 2022-05-04 ENCOUNTER — HOSPITAL ENCOUNTER (OUTPATIENT)
Dept: PHYSICAL THERAPY | Age: 47
Discharge: HOME | End: 2022-05-04
Payer: COMMERCIAL

## 2022-05-04 PROCEDURE — 97530 THERAPEUTIC ACTIVITIES: CPT

## 2022-05-04 PROCEDURE — 97110 THERAPEUTIC EXERCISES: CPT

## 2022-05-04 NOTE — PROGRESS NOTES
PHYSICAL THERAPY - DAILY TREATMENT NOTE    Patient Name: Angelica Napoles        Date: 2022  : 1975   yes Patient  Verified  Visit #:   8   of   13  Insurance: Payor: Chel Gold / Plan: Goyo Ann / Product Type: HMO /      In time: 11:45 Out time: 12:45   Total Treatment Time: 60     Medicare/[facility] Time Tracking (below)   Total Timed Codes (min):  50 1:1 Treatment Time:  50     TREATMENT AREA =  Neck pain [M54.2]  Chronic pain of both shoulders [M25.511, G89.29, M25.512]  Chronic midline thoracic back pain [M54.6, G89.29]    SUBJECTIVE  Pain Level (on 0 to 10 scale):  0  / 10   Medication Changes/New allergies or changes in medical history, any new surgeries or procedures?    no  If yes, update Summary List   Subjective Functional Status/Changes:  []  No changes reported     My neck and back have both been feeling pretty good over the past couple of days, I haven't really had any pain to speak of since .           OBJECTIVE  Modalities Rationale:     decrease pain and increase tissue extensibility to improve patient's ability to improve functional abilities    min [] Estim, type/location:                                      []  att     []  unatt     []  w/US     []  w/ice    []  w/heat    min []  Mechanical Traction: type/lbs                   []  pro   []  sup   []  int   []  cont    []  before manual    []  after manual    min []  Ultrasound, settings/location:      min []  Iontophoresis w/ dexamethasone, location:                                               []  take home patch       []  in clinic   10 min []  Ice     [x]  Heat    location/position: Cervical/lumbar in supine     min []  Vasopneumatic Device, press/temp:    If using vaso (only need to measure limb vaso being performed on)      pre-treatment girth :       post-treatment girth :       measured at (landmark location) :    Vasopnuematic compression justification:  Per bilateral girth measures taken and listed above the edema is considered significant and having an impact on the patient's     min []  Other:    [x] Skin assessment post-treatment (if applicable):    [x]  intact    [x]  redness- no adverse reaction                  []redness - adverse reaction:      35  1:1 min Therapeutic Exercise:  [x]  See flow sheet   Rationale:      increase ROM and increase strength to improve the patients ability to improve functional abilities      15  1:1 min Therapeutic Activity: [x]  See flow sheet   Rationale: Through reassessment of all established goals including mobility and strength reassessment for progress note      Billed With/As:   [] TE   [] TA   [] Neuro   [] Self Care Patient Education: [x] Review HEP    [] Progressed/Changed HEP based on:   [] positioning   [] body mechanics   [] transfers   [] heat/ice application    [] other:      Other Objective/Functional Measures:       Post Treatment Pain Level (on 0 to 10) scale:   1  / 10     ASSESSMENT  Assessment/Changes in Function:   See Progress note/Physician update for full detailed progress towards established goals. [x]  See Progress Note/Recertification   Patient will continue to benefit from skilled PT services to modify and progress therapeutic interventions, address functional mobility deficits, address ROM deficits, address strength deficits, analyze and address soft tissue restrictions, analyze and cue movement patterns, analyze and modify body mechanics/ergonomics, assess and modify postural abnormalities and instruct in home and community integration to attain remaining goals. Progress toward goals / Updated goals:  See Progress note/Physician update for full detailed progress towards established goals.      PLAN  [x]  Upgrade activities as tolerated yes Continue plan of care   []  Discharge due to :    []  Other:      Therapist: Yoni Núñez PTA    Date: 5/4/2022 Time: 11:47 AM     Future Appointments   Date Time Provider Jenny Lockwood   5/11/2022 11:45 AM Vinayak Duggan, PTA [facility]   5/18/2022 11:45 AM Vinayak Duggan, PTA [facility]   5/25/2022 11:45 AM Vinayak Duggan, PTA [facility]

## 2022-05-04 NOTE — PROGRESS NOTES
[address] [place] [address] PHYSICAL THERAPY  [facility], [place], [address] - Phone: [phone]  Fax: [phone]  PROGRESS NOTE  Patient Name: Aparna Jaquez : 1975   Treatment/Medical Diagnosis: Neck pain [M54.2]  Chronic pain of both shoulders [M25.511, G89.29, M25.512]  Chronic midline thoracic back pain [M54.6, G89.29]   Referral Source: William Capps MD     Date of Initial Visit: 3/8/22 Attended Visits: 8 Missed Visits: 0     SUMMARY OF TREATMENT  Therapeutic exercise for cervical/lumbar mobility, postural awareness/strengthening, cervicothoracic/lumbopelvic/core stabilization, manual intervention, moist heat, patient education and HEP. CURRENT STATUS  Pt reports 60% overall improvement in sx since beginning care. Pt reports 8/10 max pain, 2/10 avg pain. Pain made worse after extensive shoulder/UE focused therex regiment last treatment that lasted for 2-3 days after last treatment. Pt is making good progress with gaining cervical and lumbar mobility as well as advancing with strengthening and cervicothoracic/lumbopelvic/core stabilization within current POC. Improvements:Pt reports the most functional improvement with increased postural awareness, decreased frequency and intensity of pain/symptoms as well as increased cervical and lumbar mobility with ADL's since initial evaluation. Remaining functional limitations:  Increased limitations/pain/symptoms after extensive shoulder/UE focused therex regiment last treatment that lasted for 2-3 days after last treatment. Objective Measurements:  Cervical AROM= Flexion= 46 deg; Extension= 65 deg; Side bending= R= 38 deg, L= 41 deg; Rotation= R= 83 deg, L= 76 deg  Lumbar AROM= Flexion= full/WFL's; Extension= 60%; Side bending= full/WFL's bilaterally   Shoulder strength measurements/MMT= Flexion= R= 4/5, L= 4-/5; Abduction= R= 4/5, L= 4-/5  FOTO 59/100    Goal/Measure of Progress Goal Met? 1.    Pt will maintain deep c/s endurance test x 21\" w/o compensation to improve c/s stability for ADLs   Status at last Eval: 14 seconds with visible difficulty with endurance Current Status: >30 seconds with decreased compensation compared to last reassessment  yes   2. Improve FOTO score to >/= 60/100 to indicate improved function   Status at last Eval: 48/100 Current Status: 59/100 progress   3. Pt will report at least 70% overall improvement in sx in order to return to premorbid status. Status at last Eval: 50% improvement reported Current Status: 60% improvement reported progress     Goal/Measure of Progress Goal Met? 4. Pt will improve B shoulder flex/abd MMT by at least 1/3 grade to reduce pain with lifting activities ([facility] 4-/5 B flex and abd)   Status at last Eval: ([facility] 4-/5 B flex and abd) Current Status: Flexion= R= 4/5, L= 4-/5; Abduction= R= 4/5, L= 4-/5 Partially Met     New Goals to be achieved in __5__  treatments:  1. Improve FOTO score to >/= 66/100 to indicate improved function  2. Pt will report at least 70% overall improvement in sx in order to return to premorbid status. 3. Pt will improve shoulder flex/abd MMT to 4+/5 bilaterally to reduce pain with lifting activities  4. Pt will be given and instructed in an updated HEP for continued self maintenance of reduced symptoms after D/C from therapy. RECOMMENDATIONS  Continue with current POC for 5 remaining visits left on current script with advancing as tolerated, then reassess for discharge from therapy as planned/discussed. If you have any questions/comments please contact us directly at [phone]. Thank you for allowing us to assist in the care of your patient.     Therapist Signature: Cedrick Joseph, PTA Date: 5/4/2022    Carrie Jorge PT, DPT, CMTPT Time: 12:02 PM   NOTE TO PHYSICIAN:  PLEASE COMPLETE THE ORDERS BELOW AND FAX TO   [facility]: [phone]  If you are unable to process this request in 24 hours please contact our office: [phone]    ___ I have read the above report and request that my patient continue as recommended.   ___ I have read the above report and request that my patient continue therapy with the following changes/special instructions:_________________________________________________________   ___ I have read the above report and request that my patient be discharged from therapy.      Physician Signature:        Date:       Time:       Emma Bull MD

## 2022-05-11 ENCOUNTER — HOSPITAL ENCOUNTER (OUTPATIENT)
Dept: PHYSICAL THERAPY | Age: 47
Discharge: HOME | End: 2022-05-11
Payer: COMMERCIAL

## 2022-05-11 PROCEDURE — 97140 MANUAL THERAPY 1/> REGIONS: CPT

## 2022-05-11 PROCEDURE — 97110 THERAPEUTIC EXERCISES: CPT

## 2022-05-11 NOTE — PROGRESS NOTES
PHYSICAL THERAPY - DAILY TREATMENT NOTE    Patient Name: Hiral Crouch        Date: 2022  : 1975   yes Patient  Verified  Visit #:     Insurance: Payor: Shaina Morales / Plan: Balire Watters / Product Type: HMO /      In time: 11:46 Out time: 12:50   Total Treatment Time: 64     Medicare/[facility] Time Tracking (below)   Total Timed Codes (min):  54 1:1 Treatment Time:  54     TREATMENT AREA =  Neck pain [M54.2]  Chronic pain of both shoulders [M25.511, G89.29, M25.512]  Chronic midline thoracic back pain [M54.6, G89.29]    SUBJECTIVE  Pain Level (on 0 to 10 scale):  2 / 10   Medication Changes/New allergies or changes in medical history, any new surgeries or procedures?     yes  If yes, update Summary List   Subjective Functional Status/Changes:  []  No changes reported     My neck is bothering me more than my back today, but it's pretty much equal on both sides and not as bad as usual.          OBJECTIVE  Modalities Rationale:     decrease pain and increase tissue extensibility to improve patient's ability to improve functional abilities    min [] Estim, type/location:                                      []  att     []  unatt     []  w/US     []  w/ice    []  w/heat    min []  Mechanical Traction: type/lbs                   []  pro   []  sup   []  int   []  cont    []  before manual    []  after manual    min []  Ultrasound, settings/location:      min []  Iontophoresis w/ dexamethasone, location:                                               []  take home patch       []  in clinic   10 min []  Ice     [x]  Heat    location/position: Cervical/lumbar in supine     min []  Vasopneumatic Device, press/temp:    If using vaso (only need to measure limb vaso being performed on)      pre-treatment girth :       post-treatment girth :       measured at (landmark location) :    Vasopnuematic compression justification:  Per bilateral girth measures taken and listed above the edema is considered significant and having an impact on the patient's     min []  Other:    [x] Skin assessment post-treatment (if applicable):    [x]  intact    [x]  redness- no adverse reaction                  []redness - adverse reaction:      39  1:1 min Therapeutic Exercise:  [x]  See flow sheet   Rationale:      increase ROM and increase strength to improve the patients ability to improve functional abilities      15  1:1 min Manual Therapy: SOR, manual bilateral upper trap stretching with overpressure and STM/tissue mobs to bilateral cervical musculature in sitting    Rationale:      decrease pain, increase ROM, increase tissue extensibility, decrease trigger points and increase postural awareness to improve patient's ability to improve functional mobility   The manual therapy interventions were performed at a separate and distinct time from the therapeutic activities interventions. Billed With/As:   [] TE   [] TA   [] Neuro   [] Self Care Patient Education: [x] Review HEP    [] Progressed/Changed HEP based on:   [] positioning   [] body mechanics   [] transfers   [] heat/ice application    [] other:      Other Objective/Functional Measures:  Verbal cueing for proper form/technique with various exercises during treatment today  Bilateral SCM/lateral cervical tension noted with manual intervention. Post Treatment Pain Level (on 0 to 10) scale:   1  / 10     ASSESSMENT  Assessment/Changes in Function:   Patient reports approximately 70% overall improvement from therapy since initial evaluation. Pt presented with chief c/o mild equal cervical and lumbar pain/symptoms, but decreased in frequency and intensity since last treatment. Pt still presents with increased bilateral SCM/lateral cervical tension with manual intervention. Will continue to progress/advance patient within current POC as tolerated with monitoring symptoms.      []  See Progress Note/Recertification   Patient will continue to benefit from skilled PT services to modify and progress therapeutic interventions, address functional mobility deficits, address ROM deficits, address strength deficits, analyze and address soft tissue restrictions, analyze and cue movement patterns, analyze and modify body mechanics/ergonomics, assess and modify postural abnormalities and instruct in home and community integration to attain remaining goals. Progress toward goals / Updated goals:  New Goals to be achieved in __5__  treatments:  1.  Improve FOTO score to >/= 66/100 to indicate improved function  2. Pt will report at least 70% overall improvement in sx in order to return to premorbid status. 5/11/22: Met, Pt reports approximately 70% overall improvement from therapy since initial evaluation  3. Pt will improve shoulder flex/abd MMT to 4+/5 bilaterally to reduce pain with lifting activities  4. Pt will be given and instructed in an updated HEP for continued self maintenance of reduced symptoms after D/C from therapy.      PLAN  [x]  Upgrade activities as tolerated yes Continue plan of care   []  Discharge due to :    []  Other:      Therapist: Rufino Goff PTA    Date: 5/11/2022 Time: 11:47 AM     Future Appointments   Date Time Provider Jenny Lockwood   5/18/2022 11:45 AM CHRISTINA Khanna [facility]   5/25/2022 11:45 AM CHRISTINA Khanna [facility]

## 2022-05-18 ENCOUNTER — APPOINTMENT (OUTPATIENT)
Dept: PHYSICAL THERAPY | Age: 47
End: 2022-05-18
Payer: COMMERCIAL

## 2022-05-25 ENCOUNTER — APPOINTMENT (OUTPATIENT)
Dept: PHYSICAL THERAPY | Age: 47
End: 2022-05-25
Payer: COMMERCIAL

## 2022-06-01 ENCOUNTER — HOSPITAL ENCOUNTER (OUTPATIENT)
Dept: PHYSICAL THERAPY | Age: 47
Discharge: HOME | End: 2022-06-01
Payer: COMMERCIAL

## 2022-06-01 PROCEDURE — 97110 THERAPEUTIC EXERCISES: CPT

## 2022-06-01 PROCEDURE — 97530 THERAPEUTIC ACTIVITIES: CPT

## 2022-06-01 PROCEDURE — 97140 MANUAL THERAPY 1/> REGIONS: CPT

## 2022-06-01 RX ORDER — NAPROXEN 500 MG/1
TABLET ORAL
Qty: 60 TABLET | Refills: 5 | Status: SHIPPED | OUTPATIENT
Start: 2022-06-01

## 2022-06-01 NOTE — PROGRESS NOTES
[address] [place] [address] PHYSICAL THERAPY  [facility] [address], [place], [address] - Phone: [phone]  Fax: [phone]  PROGRESS NOTE  Patient Name: Zoraida Fernández : 1975   Treatment/Medical Diagnosis: Neck pain [M54.2]  Chronic pain of both shoulders [M25.511, G89.29, M25.512]  Chronic midline thoracic back pain [M54.6, G89.29]   Referral Source: Karan Carlisle MD     Date of Initial Visit: 3/8/22 Attended Visits: 10 Missed Visits: 0     SUMMARY OF TREATMENT  Therapeutic exercise for cervical/lumbar mobility, postural awareness/strengthening, cervicothoracic/lumbopelvic/core stabilization, manual intervention, moist heat, patient education and HEP. CURRENT STATUS  Pt reports 75% overall improvement in sx since beginning care. Pt reports 4/10 max pain, 2/10 avg pain. Pain made worse with heavier lifting and carrying type ADL's as well as intermittent exacerbation of cervicogenic, tension and migraine headaches with no particular change/increase in activity. Pt is making good progress with gaining cervical and lumbar mobility as well as advancing with strengthening and cervicothoracic/lumbopelvic/core stabilization within current POC. However, Pt just returned from approximately 3 week leave of absence due to onset of COVID-19 since last treatment. Pt would benefit from continuing with therapy for 3 remaining visits left on current script with primary focus on remaining cervical symptoms. Improvements: Pt reports the most functional improvement with all lumbar pain/symptoms abolished with no reoccurrence over the past 2-3 weeks as well as decreased intensity and frequency of cervical pain/symptoms over the past few treatments.   Remaining functional limitations:  Increased limitations/pain/symptoms with heavier lifting and carrying type ADL's as well as intermittent exacerbation of cervicogenic, tension and migraine headaches with no particular change/increase in activity. .       Objective Measurements:  Cervical AROM= Flexion= 45 deg; Extension= 65 deg; Side bending= R= 40 deg, L= 42 deg; Rotation= R= 85 deg, L= 72 deg  Lumbar AROM= Flexion= full/WFL's; Extension= 75%; Side bending= full/WFL's bilaterally   Shoulder strength measurements/MMT= Flexion= 4+/5 bilaterally; Abduction= 4/5 bilaterally    FOTO 68/100    Goal/Measure of Progress Goal Met? 1.   Improve FOTO score to >/= 66/100 to indicate improved function   Status at last Eval: 59/100 Current Status: 68/100 yes   2. Pt will report at least 70% overall improvement in sx in order to return to premorbid status. Status at last Eval: 60% overall improvement reported  Current Status: 75% overall improvement reported yes   3. Pt will improve shoulder flex/abd MMT to 4+/5 bilaterally to reduce pain with lifting activities   Status at last Eval: Flexion= R= 4/5, L= 4-/5; Abduction= R= 4/5, L= 4-/5 Current Status: Flexion= 4+/5 bilaterally; Abduction= 4/5 bilaterally Partially Met      Goal/Measure of Progress Goal Met? 4. Pt will be given and instructed in an updated HEP for continued self maintenance of reduced symptoms after D/C from therapy   Status at last Eval: Ongoing  Current Status: Ongoing Ongoing     New Goals to be achieved in __3__  treatments:  1. Pt will improve shoulder abduction MMT to 4+/5 bilaterally to reduce pain with lifting activities  2. Pt will report =/> 50% reduction in pain/symptoms with heavier lifting and carrying type ADL's as well as intermittent exacerbation of cervicogenic, tension and migraine headaches with no particular change/increase in activity. .  3. Increase L cervical rotation AROM by =/> 10 degrees for increased cervical rotation mobility symmetry for increased cervical mobility with driving.   4.Pt will be given and instructed in an updated HEP for continued self maintenance of reduced symptoms after D/C from therapy    RECOMMENDATIONS  Continue with current POC for 3 remaining visits left on current script at a frequency of 1x/week with advancing as tolerated, then reassess for discharge from therapy as planned/discussed. If you have any questions/comments please contact us directly at [phone]. Thank you for allowing us to assist in the care of your patient. Therapist Signature: Comfort Zimmerman PTA Date: 6/1/2022    Amara Meyers PT, DPT, CMTPT Time: 1:42 PM   NOTE TO PHYSICIAN:  PLEASE COMPLETE THE ORDERS BELOW AND FAX TO   [facility] [place] [facility]: [phone]  If you are unable to process this request in 24 hours please contact our office: [phone]    ___ I have read the above report and request that my patient continue as recommended.   ___ I have read the above report and request that my patient continue therapy with the following changes/special instructions:_________________________________________________________   ___ I have read the above report and request that my patient be discharged from therapy.      Physician Signature:        Date:       Time:       Keren Leavitt MD

## 2022-06-01 NOTE — PROGRESS NOTES
PHYSICAL THERAPY - DAILY TREATMENT NOTE    Patient Name: Chanel Fong        Date: 2022  : 1975   yes Patient  Verified  Visit #:   10   of   13  Insurance: Payor: Eleazar Agee / Plan: Ximena Quill / Product Type: HMO /      In time: 1:36 Out time: 2:33   Total Treatment Time: 57     Medicare/BCBS Time Tracking (below)   Total Timed Codes (min):  57 1:1 Treatment Time: 39     TREATMENT AREA =  Neck pain [M54.2]  Chronic pain of both shoulders [M25.511, G89.29, M25.512]  Chronic midline thoracic back pain [M54.6, G89.29]    SUBJECTIVE  Pain Level (on 0 to 10 scale):  6  / 10   Medication Changes/New allergies or changes in medical history, any new surgeries or procedures?    no  If yes, update Summary List   Subjective Functional Status/Changes:  []  No changes reported     My lower back feels pretty good, but I woke up with a really bad headache today and it's still as bad as it was when I woke up this morning.            OBJECTIVE  Modalities Rationale:     decrease pain and increase tissue extensibility to improve patient's ability to improve functional abilities    min [] Estim, type/location:                                      []  att     []  unatt     []  w/US     []  w/ice    []  w/heat    min []  Mechanical Traction: type/lbs                   []  pro   []  sup   []  int   []  cont    []  before manual    []  after manual    min []  Ultrasound, settings/location:      min []  Iontophoresis w/ dexamethasone, location:                                               []  take home patch       []  in clinic   10 min []  Ice     [x]  Heat    location/position: Seated/cervical    min []  Vasopneumatic Device, press/temp:    If using vaso (only need to measure limb vaso being performed on)      pre-treatment girth :       post-treatment girth :       measured at (landmark location) :    Vasopnuematic compression justification:  Per bilateral girth measures taken and listed above the edema is considered significant and having an impact on the patient's     min []  Other:    [x] Skin assessment post-treatment (if applicable):    [x]  intact    [x]  redness- no adverse reaction                  []redness - adverse reaction:      32  24  1:1 min Therapeutic Exercise:  [x]  See flow sheet   Rationale:      increase ROM and increase strength to improve the patients ability to improve functional abilities      15  1:1 min Therapeutic Activity: [x]  See flow sheet   Rationale: Through reassessment of all established goals including mobility and strength reassessment for progress note    Billed With/As:   [] TE   [] TA   [] Neuro   [] Self Care Patient Education: [x] Review HEP    [] Progressed/Changed HEP based on:   [] positioning   [] body mechanics   [] transfers   [] heat/ice application    [] other:      Other Objective/Functional Measures:       Post Treatment Pain Level (on 0 to 10) scale:   5  / 10     ASSESSMENT  Assessment/Changes in Function:   See Progress note/Physician update for full detailed progress towards established goals. [x]  See Progress Note/Recertification   Patient will continue to benefit from skilled PT services to modify and progress therapeutic interventions, address functional mobility deficits, address ROM deficits, address strength deficits, analyze and address soft tissue restrictions, analyze and cue movement patterns, analyze and modify body mechanics/ergonomics, assess and modify postural abnormalities and instruct in home and community integration to attain remaining goals. Progress toward goals / Updated goals:  See Progress note/Physician update for full detailed progress towards established goals.      PLAN  [x]  Upgrade activities as tolerated yes Continue plan of care   []  Discharge due to :    []  Other:      Therapist: Orland Sicard, PTA    Date: 6/1/2022 Time: 1:35 PM     Future Appointments   Date Time Provider Jenny Lockwood   6/8/2022 11:30 AM Jose Edge, PT [facility]

## 2022-06-08 ENCOUNTER — HOSPITAL ENCOUNTER (OUTPATIENT)
Dept: PHYSICAL THERAPY | Age: 47
Discharge: HOME | End: 2022-06-08
Payer: COMMERCIAL

## 2022-06-08 PROCEDURE — 97140 MANUAL THERAPY 1/> REGIONS: CPT

## 2022-06-08 PROCEDURE — 97530 THERAPEUTIC ACTIVITIES: CPT

## 2022-06-08 PROCEDURE — 97110 THERAPEUTIC EXERCISES: CPT

## 2022-06-08 NOTE — PROGRESS NOTES
PHYSICAL THERAPY - DAILY TREATMENT NOTE    Patient Name: Abi Turner        Date: 2022  : 1975   yes Patient  Verified  Visit #:     Insurance: Payor: Tiana Kanner / Plan: Leno Flores / Product Type: HMO /      In time: 11:38 Out time: 12:51   Total Treatment Time: 63     Medicare/BCBS Time Tracking (below)   Total Timed Codes (min):  53 1:1 Treatment Time: 43     TREATMENT AREA =  Neck pain [M54.2]  Chronic pain of both shoulders [M25.511, G89.29, M25.512]  Chronic midline thoracic back pain [M54.6, G89.29]    SUBJECTIVE  Pain Level (on 0 to 10 scale):  4  / 10   Medication Changes/New allergies or changes in medical history, any new surgeries or procedures?    no  If yes, update Summary List   Subjective Functional Status/Changes:  []  No changes reported     Pt reports most pain to neck. 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 States prolonged sitting exacerbates neck pain.        OBJECTIVE  Modalities Rationale:     decrease pain and increase tissue extensibility to improve patient's ability to improve functional abilities    min [] Estim, type/location:                                      []  att     []  unatt     []  w/US     []  w/ice    []  w/heat    min []  Mechanical Traction: type/lbs                   []  pro   []  sup   []  int   []  cont    []  before manual    []  after manual    min []  Ultrasound, settings/location:      min []  Iontophoresis w/ dexamethasone, location:                                               []  take home patch       []  in clinic   10 min []  Ice     [x]  Heat    location/position: C/s in supine    min []  Vasopneumatic Device, press/temp:         min []  Other:    [x] Skin assessment post-treatment (if applicable):    [x]  intact    [x]  redness- no adverse reaction                  []redness - adverse reaction:      13  1:1  (23) min Therapeutic Exercise:  [x]  See flow sheet   Rationale:      increase ROM and increase strength to improve the patients ability to improve functional abilities      20  1:1 min Therapeutic Activity: [x]  See flow sheet  -band rows, band pull downs, shoulder flex/scaption    Rationale:    to improve functional activities, model real life movements and performance specific to the patient's need with supervision to return the patient to their PLOF          10  1:1 min Manual Therapy: DTM/TrP release to b/l mid traps, rhomboids and t/s paraspinals L>R. STM L UT, post scalenes, R SCM, lev scap. Manual R SCM stretch, manual L post scalene/UT stretch. Grade 3-4 L 1st rib mobs. Rationale:      decrease pain, increase ROM, increase tissue extensibility, decrease trigger points and increase postural awareness to improve patient's ability to improve functional mobility   The manual therapy interventions were performed at a separate and distinct time from the therapeutic activities interventions. Billed With/As:   [x] TE   [] TA   [] Neuro   [] Self Care Patient Education: [x] Review HEP    [] Progressed/Changed HEP based on:   [] positioning   [] body mechanics   [] transfers   [] heat/ice application    [] other:      Other Objective/Functional Measures:  Seated c/s ROT: R 77, L 82  -added wall push up for progressive strengthening  -performed FR t/s ext in sitting today  -added prone M with cues for scap retraction/depression. Post Treatment Pain Level (on 0 to 10) scale:   0  / 10     ASSESSMENT  Assessment/Changes in Function:   Pt demonstrates excellent progress since last session with increased L c/s ROT by 10 deg (although R rotation slightly decreased). She had significant TrP's to thoracic mm's and moderate tightness to c/s mm's addressed with manual therapy and good reduction in tone and pain improvement attained. Pt required increased cues to decrease SCM substitution with chin tucks. Plan for progressive scap  retraining and strength for decreased postural strain.      [x] See Progress Note/Recertification   Patient will continue to benefit from skilled PT services to modify and progress therapeutic interventions, address functional mobility deficits, address ROM deficits, address strength deficits, analyze and address soft tissue restrictions, analyze and cue movement patterns, analyze and modify body mechanics/ergonomics, assess and modify postural abnormalities and instruct in home and community integration to attain remaining goals. Progress toward goals / Updated goals:    New Goals to be achieved in __3__  treatments:  1. Pt will improve shoulder abduction MMT to 4+/5 bilaterally to reduce pain with lifting activities  2. Pt will report =/> 50% reduction in pain/symptoms with heavier lifting and carrying type ADL's as well as intermittent exacerbation of cervicogenic, tension and migraine headaches with no particular change/increase in activity. .  3. Increase L cervical rotation AROM by =/> 10 degrees for increased cervical rotation mobility symmetry for increased cervical mobility with driving.-6/7: goal met; c/s rotation L 82 deg (6/1= 72 deg)  4. Pt will be given and instructed in an updated HEP for continued self maintenance of reduced symptoms after D/C from therapy        PLAN  [x]  Upgrade activities as tolerated yes Continue plan of care   []  Discharge due to :    []  Other:      Therapist: Nathan Sigala.  Radha Khan, PT    Date: 6/8/2022 Time: 12:59 PM      Future Appointments   Date Time Provider Jenny Lockwood   6/8/2022 11:30 AM Merle Reyez., PT [facility]   6/15/2022 11:30 AM Merle Greenwood, PT [facility]   6/22/2022  2:15 PM Jenna Krishnamurthy PTA [facility]

## 2022-06-15 ENCOUNTER — HOSPITAL ENCOUNTER (OUTPATIENT)
Dept: PHYSICAL THERAPY | Age: 47
Discharge: HOME | End: 2022-06-15
Payer: COMMERCIAL

## 2022-06-15 PROCEDURE — 97110 THERAPEUTIC EXERCISES: CPT

## 2022-06-15 PROCEDURE — 97530 THERAPEUTIC ACTIVITIES: CPT

## 2022-06-15 PROCEDURE — 97140 MANUAL THERAPY 1/> REGIONS: CPT

## 2022-06-15 NOTE — PROGRESS NOTES
PHYSICAL THERAPY - DAILY TREATMENT NOTE    Patient Name: Raghu Amin        Date: 6/15/2022  : 1975   yes Patient  Verified  Visit #:     Insurance: Payor: Maty Rowland / Plan: Hollie Aguilera / Product Type: HMO /      In time: 11:31 Out time: 12:46   Total Treatment Time: 75     Medicare/[facility] Time Tracking (below)   Total Timed Codes (min):  61 1:1 Treatment Time: 61     TREATMENT AREA =  Neck pain [M54.2]  Chronic pain of both shoulders [M25.511, G89.29, M25.512]  Chronic midline thoracic back pain [M54.6, G89.29]    SUBJECTIVE  Pain Level (on 0 to 10 scale):  2  / 10   Medication Changes/New allergies or changes in medical history, any new surgeries or procedures?    no  If yes, update Summary List   Subjective Functional Status/Changes:  []  No changes reported     Pt states she felt good after last session for about a day and then the stress at work increased pain again. C/o HA in [employer] b/[employer]. Pt states she had terrible migraine pain on Saturday up to a 9/10; \"I was close to tears\". Pt reports she feels ~80% improved overall from [facility]. She only has some LBP with prolonged standing activity. Mostly neck/mid back pain with lifting (groceries), doing laundry, washing dishes.        OBJECTIVE  Modalities Rationale:     decrease pain and increase tissue extensibility to improve patient's ability to improve functional abilities    min [] Estim, type/location:                                      []  att     []  unatt     []  w/US     []  w/ice    []  w/heat    min []  Mechanical Traction: type/lbs                   []  pro   []  sup   []  int   []  cont    []  before manual    []  after manual    min []  Ultrasound, settings/location:      min []  Iontophoresis w/ dexamethasone, location:                                               []  take home patch       []  in clinic   10 min []  Ice     [x]  Heat    location/position: C/s in supine    min []  Vasopneumatic Device, press/temp:         min []  Other:    [x] Skin assessment post-treatment (if applicable):    [x]  intact    [x]  redness- no adverse reaction                  []redness - adverse reaction:      36  1:1 min Therapeutic Exercise:  [x]  See flow sheet   Rationale:      increase ROM and increase strength to improve the patients ability to improve functional abilities      15  1:1 min Therapeutic Activity: [x]  See flow sheet  -band rows, band pull downs, shoulder flex/scaption    Rationale:    to improve functional activities, model real life movements and performance specific to the patient's need with supervision to return the patient to their PLOF          10  1:1 min Manual Therapy: DTM/TrP release to R>L t/s paraspinals. STM L C6-7 paraspinals, b/l scalenes. Manual L levator scap stretch. Grade 3-4 P/A mid t/s mobs   Rationale:      decrease pain, increase ROM, increase tissue extensibility, decrease trigger points and increase postural awareness to improve patient's ability to improve functional mobility   The manual therapy interventions were performed at a separate and distinct time from the therapeutic activities interventions. Billed With/As:   [x] TE   [] TA   [] Neuro   [] Self Care Patient Education: [x] Review HEP    [] Progressed/Changed HEP based on:   [] positioning   [] body mechanics   [] transfers   [] heat/ice application    [x] other: updated HEP with new exercises today     Other Objective/Functional Measures:    -added SCM stretch, rhomboid stretch, prone \"Y\"  -pt ed and trialed use of cervical peanut for sub-occipital release/migraine relief PRN     Post Treatment Pain Level (on 0 to 10) scale:   0  / 10     ASSESSMENT  Assessment/Changes in Function:   Pt with good reduction in cervico-thoracic mm tone vs last session with this PT. Pt continues to have moderate hypertonicity to b/l scalenes and left levator/thoracic paraspinals.  Pt ed on recommendation for progression of cervico-thoracic stability and strength to promote ability to resume all normal activities without pain limitation. Plan for formal re-assessment NV with recommendation to either d/c to HEP independently, or possible progression of skilled PT for close monitoring of sx response and ability to advance appropriately. [x]  See Progress Note/Recertification   Patient will continue to benefit from skilled PT services to modify and progress therapeutic interventions, address functional mobility deficits, address ROM deficits, address strength deficits, analyze and address soft tissue restrictions, analyze and cue movement patterns, analyze and modify body mechanics/ergonomics, assess and modify postural abnormalities and instruct in home and community integration to attain remaining goals. Progress toward goals / Updated goals:    New Goals to be achieved in __3__  treatments:  1. Pt will improve shoulder abduction MMT to 4+/5 bilaterally to reduce pain with lifting activities  2. Pt will report =/> 50% reduction in pain/symptoms with heavier lifting and carrying type ADL's as well as intermittent exacerbation of cervicogenic, tension and migraine headaches with no particular change/increase in activity. -6/15: goal met; reports 80% improvement  3. Increase L cervical rotation AROM by =/> 10 degrees for increased cervical rotation mobility symmetry for increased cervical mobility with driving.-6/8: goal met; c/s rotation L 82 deg (6/1= 72 deg)  4. Pt will be given and instructed in an updated HEP for continued self maintenance of reduced symptoms after D/C from therapy        PLAN  [x]  Upgrade activities as tolerated yes Continue plan of care   []  Discharge due to :    []  Other:      Therapist: Michele Staples, PT    Date: 6/15/2022 Time: 12:46 PM      Future Appointments   Date Time Provider Jenny Lockwood   6/15/2022 11:30 AM Anuja Lwo, PT [facility]   6/22/2022  2:15 PM Leone Lombard, PTA [facility]

## 2022-06-22 ENCOUNTER — HOSPITAL ENCOUNTER (OUTPATIENT)
Dept: PHYSICAL THERAPY | Age: 47
Discharge: HOME | End: 2022-06-22
Payer: COMMERCIAL

## 2022-06-22 PROCEDURE — 97530 THERAPEUTIC ACTIVITIES: CPT

## 2022-06-22 PROCEDURE — 97110 THERAPEUTIC EXERCISES: CPT

## 2022-06-22 NOTE — PROGRESS NOTES
[address] [place] [address] PHYSICAL THERAPY  [facility], [place], [address] - Phone: [phone]  Fax: [phone]  DISCHARGE SUMMARY  Patient Name: Zoraida Fernández : 1975   Treatment/Medical Diagnosis: Neck pain [M54.2]  Chronic pain of both shoulders [M25.511, G89.29, M25.512]  Chronic midline thoracic back pain [M54.6, G89.29]   Referral Source: Karan Carlisle MD     Date of Initial Visit: 3/8/22 Attended Visits: 13 Missed Visits: 0     SUMMARY OF TREATMENT  Therapeutic exercise for cervical/lumbar mobility, postural awareness/strengthening, cervicothoracic/lumbopelvic/core stabilization, manual intervention, moist heat, patient education and HEP. CURRENT STATUS  Pt reports 85% overall improvement in sx since start of care. Notes max pain 2/10 with constant cervicogenic headaches as well as prolonged standing with erect posture  Pt has made good progress with gaining cervical and lumbar mobility as well as advancing with strengthening and cervicothoracic/lumbopelvic/core stabilization within current POC. Improvements: Pt reports the most functional improvement with decreased frequency and intensity of cervical and lumbar pain/symptoms as well as improved mobility with ADL's. Remaining Functional limitations: Increased limitations/pain/symptoms with constant cervicogenic headaches as well as prolonged standing with erect posture       Objective:   Cervical AROM= Flexion= 45 deg; Extension= 70 deg; Side bending= R= 44 deg, L= 45 deg; Rotation= R= 82 deg, L= 80 deg  Lumbar AROM= Flexion= full/WFL's; Extension= 75%; Side bending= full/WFL's bilaterally   Shoulder strength measurements/MMT= Flexion= 4+/5 bilaterally; Abduction= 4/5 bilaterally     FOTO 75/100    Goal/Measure of Progress Goal Met? 1.   Pt will improve shoulder abduction MMT to 4+/5 bilaterally to reduce pain with lifting activities   Status at last Eval: 4/5 bilaterally Current Status: 4/5 bilaterally no   2. Pt will report =/> 50% reduction in pain/symptoms with heavier lifting and carrying type ADL's as well as intermittent exacerbation of cervicogenic, tension and migraine headaches with no particular change/increase in activity. .   Status at last Eval: New goal established last assessment  Current Status: No improvement with constant headaches and has not attempted heavier lifting type activities  no   3. Increase L cervical rotation AROM by =/> 10 degrees for increased cervical rotation mobility symmetry for increased cervical mobility with driving. Status at last Eval:  L= 72 deg Current Status:  L= 80 deg progress     Goal/Measure of Progress Goal Met? 4. Pt will be given and instructed in an updated HEP for continued self maintenance of reduced symptoms after D/C from therapy   Status at last Eval: Ongoing  Current Status: Met, updated and issued HEP last visit yes     RECOMMENDATIONS  Patient feels that she has achieved maximum medical benefit/potential from current POC after completing 13 of 13 prescribed visits and is ready for discharge from therapy at this time. If you have any questions/comments please contact us directly at [phone]. Thank you for allowing us to assist in the care of your patient.     Therapist Signature: Jammie Spaulding PTA Date: 6/22/22    Delvin Mcguire PT, DPT, CMTPT Time: 2:48 PM

## 2022-06-22 NOTE — PROGRESS NOTES
PHYSICAL THERAPY - DAILY TREATMENT NOTE    Patient Name: Mitali         Date: 2022  : 1975   yes Patient  Verified  Visit #:   15   of   13  Insurance: Payor: [employer] / Plan: Britt Recinos / Product Type: HMO /      In time: 2:28 Out time: 3:40   Total Treatment Time: 72     Medicare/BCBS Time Tracking (below)   Total Timed Codes (min):  62 1:1 Treatment Time:  38     TREATMENT AREA =  Neck pain [M54.2]  Chronic pain of both shoulders [M25.511, G89.29, M25.512]  Chronic midline thoracic back pain [M54.6, G89.29]    SUBJECTIVE  Pain Level (on 0 to 10 scale):  2 / 10   Medication Changes/New allergies or changes in medical history, any new surgeries or procedures?    no  If yes, update Summary List   Subjective Functional Status/Changes:  []  No changes reported     My neck and back has been feeling pretty good over the last few visits, so I guess that we could make today my last day like we discussed at the last evaluation.           OBJECTIVE  Modalities Rationale:     decrease pain and increase tissue extensibility to improve patient's ability to improve functional mobility   min [] Estim, type/location:                                      []  att     []  unatt     []  w/US     []  w/ice    []  w/heat    min []  Mechanical Traction: type/lbs                   []  pro   []  sup   []  int   []  cont    []  before manual    []  after manual    min []  Ultrasound, settings/location:      min []  Iontophoresis w/ dexamethasone, location:                                               []  take home patch       []  in clinic   10 min []  Ice     [x]  Heat    location/position: Cervical/supine    min []  Vasopneumatic Device, press/temp:    If using vaso (only need to measure limb vaso being performed on)      pre-treatment girth :       post-treatment girth :       measured at (landmark location) :    Vasopnuematic compression justification:  Per bilateral girth measures taken and listed above the edema is considered significant and having an impact on the patient's     min []  Other:    [x] Skin assessment post-treatment (if applicable):    [x]  intact    [x]  redness- no adverse reaction                  []redness - adverse reaction:      47  23  1:1 min Therapeutic Exercise:  [x]  See flow sheet   Rationale:      increase ROM and increase strength to improve the patients ability to improve functional abilities         15  1:1 min Therapeutic Activities:  [x]  See flow sheet   Rationale: Through reassessment of all established goals including mobility and strength reassessment for progress note    Billed With/As:   [] TE   [] TA   [] Neuro   [] Self Care Patient Education: [x] Review HEP    [] Progressed/Changed HEP based on:   [] positioning   [] body mechanics   [] transfers   [] heat/ice application    [] other:      Other Objective/Functional Measures:       Post Treatment Pain Level (on 0 to 10) scale:   2  / 10     ASSESSMENT  Assessment/Changes in Function:   See discharge summary for full final detailed progress towards all established goals. [x]  See Progress Note/Recertification      Progress toward goals / Updated goals:  See discharge summary for full final detailed progress towards all established goals. PLAN  []  Upgrade activities as tolerated no Continue plan of care   [x]  Discharge due to : Patient feels that she has achieved maximum medical benefit/potential from current POC after completing 13 of 13 prescribed visits and is ready for discharge from therapy at this time. []  Other:      Therapist: Marissa Hargrove PTA    Date: 6/22/2022 Time: 2:38 PM     No future appointments.

## 2022-06-22 NOTE — PROGRESS NOTES
Physical Therapy Discharge Instructions  [address]  [address], [place] [address]  P: [phone] F: [phone]    Patient: Deirdre Alvarez  : 1975    Reason for Discharge From PT:  [x]Met/progressing towards all set goals    []Minimal progress made towards set goals    []Met a plateau in progress/improvement    []Insurance/financial issues    []Other:     Recommendations:   [x] Return to activity with home program as prescribed on print-outs    [] Return to activity with the following modifications:     [] [employer] fitness training     [] Return to/join local gym    [] Other:      Continue with      [] Ice [x] Heat   as needed for 10-15 minutes to relieve pain  *If pain does not improve after several days, follow-up with your physician for a consult*           Follow up with MD:     [] Upon completion of therapy   [x] As needed      Additional Comments: Great Job! Thank you for choosing [facility] [place] for your PT needs!       Misti Stinson, CHRISTINA 2022 3:10 PM

## 2022-07-05 RX ORDER — VENLAFAXINE HYDROCHLORIDE 225 MG/1
TABLET, EXTENDED RELEASE ORAL
Qty: 90 TABLET | Refills: 1 | Status: SHIPPED | OUTPATIENT
Start: 2022-07-05

## 2022-07-25 DIAGNOSIS — R51.9 NONINTRACTABLE EPISODIC HEADACHE, UNSPECIFIED HEADACHE TYPE: Chronic | ICD-10-CM

## 2022-07-25 DIAGNOSIS — Z76.0 MEDICATION REFILL: ICD-10-CM

## 2022-07-25 RX ORDER — DIAZEPAM 10 MG/1
TABLET ORAL
Qty: 90 TABLET | Refills: 5 | Status: SHIPPED | OUTPATIENT
Start: 2022-07-25

## 2022-07-25 RX ORDER — BUTALBITAL, ACETAMINOPHEN, CAFFEINE AND CODEINE PHOSPHATE 50; 325; 40; 30 MG/1; MG/1; MG/1; MG/1
CAPSULE ORAL
Qty: 30 CAPSULE | Refills: 5 | Status: SHIPPED | OUTPATIENT
Start: 2022-07-25 | End: 2022-08-24

## 2022-11-21 DIAGNOSIS — R51.9 NONINTRACTABLE EPISODIC HEADACHE, UNSPECIFIED HEADACHE TYPE: Chronic | ICD-10-CM

## 2022-11-21 RX ORDER — BUTALBITAL, ACETAMINOPHEN, CAFFEINE AND CODEINE PHOSPHATE 50; 325; 40; 30 MG/1; MG/1; MG/1; MG/1
CAPSULE ORAL
Qty: 30 CAPSULE | Refills: 5 | Status: SHIPPED | OUTPATIENT
Start: 2022-11-21 | End: 2022-12-21

## 2023-01-17 RX ORDER — VENLAFAXINE HYDROCHLORIDE 225 MG/1
TABLET, EXTENDED RELEASE ORAL
Qty: 90 TABLET | Refills: 1 | Status: SHIPPED | OUTPATIENT
Start: 2023-01-17

## 2023-01-30 RX ORDER — METHOCARBAMOL 750 MG/1
TABLET, FILM COATED ORAL
Qty: 60 TABLET | Refills: 2 | Status: SHIPPED | OUTPATIENT
Start: 2023-01-30

## 2023-01-31 ENCOUNTER — OFFICE VISIT (OUTPATIENT)
Dept: INTERNAL MEDICINE CLINIC | Age: 48
End: 2023-01-31
Payer: COMMERCIAL

## 2023-01-31 VITALS
TEMPERATURE: 97.3 F | WEIGHT: 150 LBS | HEART RATE: 64 BPM | BODY MASS INDEX: 24.11 KG/M2 | RESPIRATION RATE: 15 BRPM | SYSTOLIC BLOOD PRESSURE: 104 MMHG | OXYGEN SATURATION: 98 % | DIASTOLIC BLOOD PRESSURE: 68 MMHG | HEIGHT: 66 IN

## 2023-01-31 DIAGNOSIS — F32.A DEPRESSION, UNSPECIFIED DEPRESSION TYPE: Primary | ICD-10-CM

## 2023-01-31 DIAGNOSIS — Z51.81 MEDICATION MONITORING ENCOUNTER: ICD-10-CM

## 2023-01-31 DIAGNOSIS — F17.200 TOBACCO USE DISORDER: ICD-10-CM

## 2023-01-31 DIAGNOSIS — Z12.11 COLON CANCER SCREENING: ICD-10-CM

## 2023-01-31 DIAGNOSIS — E78.5 DYSLIPIDEMIA: ICD-10-CM

## 2023-01-31 DIAGNOSIS — Z76.0 MEDICATION REFILL: ICD-10-CM

## 2023-01-31 PROCEDURE — 99214 OFFICE O/P EST MOD 30 MIN: CPT | Performed by: INTERNAL MEDICINE

## 2023-01-31 RX ORDER — DIAZEPAM 10 MG/1
TABLET ORAL
Qty: 90 TABLET | Refills: 5 | Status: SHIPPED | OUTPATIENT
Start: 2023-01-31

## 2023-01-31 NOTE — PROGRESS NOTES
HISTORY OF PRESENT ILLNESS  Ezra Panda is a 41 y.o. female. /68 (BP 1 Location: Left upper arm, BP Patient Position: Sitting, BP Cuff Size: Adult)   Pulse 64   Temp 97.3 °F (36.3 °C) (Temporal)   Resp 15   Ht 5' 6\" (1.676 m)   Wt 150 lb (68 kg)   LMP 01/24/2023 (Exact Date)   SpO2 98%   BMI 24.21 kg/m²       Has not been seen since May 2/25/22    Did not start the Abilify  Thinks the effexor is working well. Depression  The history is provided by the Patient. This is a chronic problem. Hip Injury       Review of Systems   Psychiatric/Behavioral:  Positive for depression. Physical Exam  Vitals and nursing note reviewed. Constitutional:       Appearance: Normal appearance. She is well-developed. HENT:      Head: Normocephalic and atraumatic. Cardiovascular:      Rate and Rhythm: Normal rate and regular rhythm. Pulmonary:      Effort: Pulmonary effort is normal.      Breath sounds: Normal breath sounds. Musculoskeletal:      Left hip: Normal.      Right lower leg: No edema. Left lower leg: No edema. Legs:       Comments: Pain is more left SI than hip itself   Skin:     General: Skin is warm and dry. Neurological:      General: No focal deficit present. Mental Status: She is alert and oriented to person, place, and time. Psychiatric:         Mood and Affect: Mood normal.         Behavior: Behavior normal.       ASSESSMENT and PLAN    ICD-10-CM ICD-9-CM    1. Depression, unspecified depression type  F32. A 311       2. Dyslipidemia  E78.5 272.4 LIPID PANEL      METABOLIC PANEL, COMPREHENSIVE      METABOLIC PANEL, COMPREHENSIVE      LIPID PANEL      3. Medication monitoring encounter  Z59.87 V16.87 METABOLIC PANEL, COMPREHENSIVE      METABOLIC PANEL, COMPREHENSIVE      4. Colon cancer screening  Z12.11 V76.51 OCCULT BLOOD IMMUNOASSAY,DIAGNOSTIC      OCCULT BLOOD IMMUNOASSAY,DIAGNOSTIC      5. Medication refill  Z76.0 V68.1 diazePAM (VALIUM) 10 mg tablet      6. Tobacco use disorder  F17.200 305.1         Doing much better on medication.  Continue Effexor  Update lab  Other orders as noted  Continues to work on smoking cessation  F/u 6 months for recheck on meds and refill

## 2023-01-31 NOTE — PROGRESS NOTES
1. \"Have you been to the ER, urgent care clinic since your last visit? Hospitalized since your last visit? \" No    2. \"Have you seen or consulted any other health care providers outside of the [address] [place] [address] since your last visit? \" No     3. For patients aged 39-70: Has the patient had a colonoscopy / FIT/ Cologuard? No      If the patient is female:    4. For patients aged 41-77: Has the patient had a mammogram within the past 2 years? Yes - no Care Gap present      5. For patients aged 21-65: Has the patient had a pap smear?  Yes - no Care Gap present

## 2023-02-01 LAB
ALBUMIN SERPL-MCNC: 4.5 G/DL (ref 3.8–4.8)
ALBUMIN/GLOB SERPL: 1.9 {RATIO} (ref 1.2–2.2)
ALP SERPL-CCNC: 84 IU/L (ref 44–121)
ALT SERPL-CCNC: 14 IU/L (ref 0–32)
AST SERPL-CCNC: 16 IU/L (ref 0–40)
BILIRUB SERPL-MCNC: <0.2 MG/DL (ref 0–1.2)
BUN SERPL-MCNC: 9 MG/DL (ref 6–24)
BUN/CREAT SERPL: 12 (ref 9–23)
CALCIUM SERPL-MCNC: 9.3 MG/DL (ref 8.7–10.2)
CHLORIDE SERPL-SCNC: 101 MMOL/L (ref 96–106)
CHOLEST SERPL-MCNC: 265 MG/DL (ref 100–199)
CO2 SERPL-SCNC: 27 MMOL/L (ref 20–29)
CREAT SERPL-MCNC: 0.75 MG/DL (ref 0.57–1)
EGFRCR SERPLBLD CKD-EPI 2021: 100 ML/MIN/1.73
GLOBULIN SER CALC-MCNC: 2.4 G/DL (ref 1.5–4.5)
GLUCOSE SERPL-MCNC: 80 MG/DL (ref 70–99)
HDLC SERPL-MCNC: 52 MG/DL
IMP & REVIEW OF LAB RESULTS: NORMAL
LDLC SERPL CALC-MCNC: 180 MG/DL (ref 0–99)
POTASSIUM SERPL-SCNC: 4.4 MMOL/L (ref 3.5–5.2)
PROT SERPL-MCNC: 6.9 G/DL (ref 6–8.5)
SODIUM SERPL-SCNC: 141 MMOL/L (ref 134–144)
TRIGL SERPL-MCNC: 177 MG/DL (ref 0–149)
VLDLC SERPL CALC-MCNC: 33 MG/DL (ref 5–40)

## 2023-03-13 ENCOUNTER — TELEPHONE (OUTPATIENT)
Facility: CLINIC | Age: 48
End: 2023-03-13

## 2023-03-13 ENCOUNTER — TRANSCRIBE ORDERS (OUTPATIENT)
Facility: HOSPITAL | Age: 48
End: 2023-03-13

## 2023-03-13 DIAGNOSIS — Z12.31 VISIT FOR SCREENING MAMMOGRAM: Primary | ICD-10-CM

## 2023-03-13 NOTE — TELEPHONE ENCOUNTER
Pt stated she wanted to set up a mammogram downstairs but needed an order for a 'Diagnostic w/ ultrasound.'

## 2023-03-13 NOTE — TELEPHONE ENCOUNTER
She will need an appointment for an updated breast exam, and imaging can be ordered at that time. Please advise and schedule.

## 2023-03-13 NOTE — TELEPHONE ENCOUNTER
S/w the pt and informed the breast pain has continued since last year's mammogram/ultrasound and request the additional imaging. Pt advised the PCP is currently out of office however will notify.

## 2023-03-13 NOTE — TELEPHONE ENCOUNTER
Chart reviewed 2022 mammogram/ultrasound impressions. Called pt and left message. Call back number left and I myself or one of the other nurses will attempt to contact again. The call was to follow up with the pt rationale for ultrasound.

## 2023-03-13 NOTE — TELEPHONE ENCOUNTER
Spoke with pt in regards to breast pain/discomfort. Two patient identifier's verified. Relayed the Covering Provider's note. Pt acknowledges understanding and is scheduled for 3/24/23.

## 2023-03-14 DIAGNOSIS — G89.29 OTHER CHRONIC PAIN: Primary | ICD-10-CM

## 2023-03-14 RX ORDER — BUTALBITAL, ACETAMINOPHEN, CAFFEINE AND CODEINE PHOSPHATE 50; 325; 40; 30 MG/1; MG/1; MG/1; MG/1
CAPSULE ORAL
Qty: 30 CAPSULE | Refills: 0 | Status: SHIPPED | OUTPATIENT
Start: 2023-03-14 | End: 2023-04-14

## 2023-03-21 ENCOUNTER — TELEPHONE (OUTPATIENT)
Facility: CLINIC | Age: 48
End: 2023-03-21

## 2023-04-18 ENCOUNTER — OFFICE VISIT (OUTPATIENT)
Facility: CLINIC | Age: 48
End: 2023-04-18
Payer: COMMERCIAL

## 2023-04-18 VITALS
WEIGHT: 142 LBS | HEIGHT: 66 IN | SYSTOLIC BLOOD PRESSURE: 109 MMHG | RESPIRATION RATE: 16 BRPM | DIASTOLIC BLOOD PRESSURE: 58 MMHG | OXYGEN SATURATION: 99 % | BODY MASS INDEX: 22.82 KG/M2 | HEART RATE: 78 BPM | TEMPERATURE: 97.2 F

## 2023-04-18 DIAGNOSIS — N63.0 MASS OF BREAST, UNSPECIFIED LATERALITY: Primary | ICD-10-CM

## 2023-04-18 PROCEDURE — 99213 OFFICE O/P EST LOW 20 MIN: CPT | Performed by: INTERNAL MEDICINE

## 2023-04-18 RX ORDER — M-VIT,TX,IRON,MINS/CALC/FOLIC 27MG-0.4MG
1 TABLET ORAL DAILY
COMMUNITY

## 2023-04-18 NOTE — PROGRESS NOTES
HISTORY OF PRESENT ILLNESS      Gregg Bloch is a 41 y.o. female. BP (!) 109/58 (Site: Left Upper Arm)   Pulse 78   Temp 97.2 °F (36.2 °C)   Resp 16   Ht 5' 6\" (1.676 m)   Wt 142 lb (64.4 kg)   SpO2 99%   BMI 22.92 kg/m²       Both breasts are painful. More at night. Dull or shart. Mostly on the sided. She does have some densities on the outer lower breast bilateral  She does wear an underwire push up bra  No nipple drainage. Review of Systems   Constitutional: Negative. Hematological: Negative. Physical Exam  Vitals and nursing note reviewed. Constitutional:       Appearance: Normal appearance. HENT:      Head: Normocephalic and atraumatic. Cardiovascular:      Rate and Rhythm: Normal rate. Pulmonary:      Effort: Pulmonary effort is normal.   Chest:       Skin:     General: Skin is warm and dry. Neurological:      General: No focal deficit present. Mental Status: She is alert and oriented to person, place, and time. Psychiatric:         Mood and Affect: Mood normal.         Behavior: Behavior normal.       ASSESSMENT and PLAN      ICD-10-CM    1. Mass of breast, unspecified laterality  N63.0 SAM DIGITAL DIAGNOSTIC W OR WO CAD BILATERAL     US BREAST LIMITED RIGHT     US BREAST LIMITED LEFT           She has lumpy breasts. Reviewed last year's mammogram and u/s and nothing significant noted but clearly has distinctive dense tissue    Order new tests this year.  F/u plan dependent on findings

## 2023-04-18 NOTE — PROGRESS NOTES
1. \"Have you been to the ER, urgent care clinic since your last visit? Hospitalized since your last visit? \"  Urgent care a month ago \"sick\" with fever    2. \"Have you seen or consulted any other health care providers outside of the [address] [place] [address] since your last visit? \" No     3. For patients aged 39-70: Has the patient had a colonoscopy / FIT/ Cologuard? No      If the patient is female:    4. For patients aged 41-77: Has the patient had a mammogram within the past 2 years? Yes - no Care Gap present      5. For patients aged 21-65: Has the patient had a pap smear?  Yes - no Care Gap present

## 2023-05-10 ENCOUNTER — APPOINTMENT (OUTPATIENT)
Facility: HOSPITAL | Age: 48
End: 2023-05-10
Payer: COMMERCIAL

## 2023-05-10 ENCOUNTER — HOSPITAL ENCOUNTER (OUTPATIENT)
Facility: HOSPITAL | Age: 48
Discharge: HOME | End: 2023-05-13
Payer: COMMERCIAL

## 2023-05-10 ENCOUNTER — HOSPITAL ENCOUNTER (OUTPATIENT)
Dept: WOMENS IMAGING | Facility: HOSPITAL | Age: 48
Discharge: HOME | End: 2023-05-13
Payer: COMMERCIAL

## 2023-05-10 DIAGNOSIS — N63.0 MASS OF BREAST, UNSPECIFIED LATERALITY: ICD-10-CM

## 2023-05-10 PROCEDURE — 76642 ULTRASOUND BREAST LIMITED: CPT

## 2023-05-10 PROCEDURE — G0279 TOMOSYNTHESIS, MAMMO: HCPCS

## 2023-07-21 RX ORDER — VENLAFAXINE HYDROCHLORIDE 225 MG/1
TABLET, EXTENDED RELEASE ORAL
Qty: 90 TABLET | Refills: 2 | Status: SHIPPED | OUTPATIENT
Start: 2023-07-21

## 2023-08-01 DIAGNOSIS — G89.29 OTHER CHRONIC PAIN: ICD-10-CM

## 2023-08-01 DIAGNOSIS — Z76.0 MEDICATION REFILL: Primary | ICD-10-CM

## 2023-08-01 RX ORDER — BUTALBITAL, ACETAMINOPHEN, CAFFEINE AND CODEINE PHOSPHATE 50; 325; 40; 30 MG/1; MG/1; MG/1; MG/1
CAPSULE ORAL
Qty: 30 CAPSULE | Refills: 5 | Status: SHIPPED | OUTPATIENT
Start: 2023-08-01 | End: 2024-01-28

## 2023-08-01 RX ORDER — DIAZEPAM 10 MG/1
TABLET ORAL
Qty: 90 TABLET | Refills: 5 | Status: SHIPPED | OUTPATIENT
Start: 2023-08-01 | End: 2023-10-30

## 2023-09-07 RX ORDER — NAPROXEN 500 MG/1
TABLET ORAL
Qty: 60 TABLET | Refills: 5 | Status: SHIPPED | OUTPATIENT
Start: 2023-09-07

## 2023-10-23 ENCOUNTER — OFFICE VISIT (OUTPATIENT)
Facility: CLINIC | Age: 48
End: 2023-10-23
Payer: COMMERCIAL

## 2023-10-23 VITALS
HEIGHT: 66 IN | DIASTOLIC BLOOD PRESSURE: 61 MMHG | RESPIRATION RATE: 17 BRPM | BODY MASS INDEX: 23.04 KG/M2 | TEMPERATURE: 96.5 F | SYSTOLIC BLOOD PRESSURE: 113 MMHG | OXYGEN SATURATION: 100 % | WEIGHT: 143.38 LBS | HEART RATE: 62 BPM

## 2023-10-23 DIAGNOSIS — G43.909 MIGRAINE WITHOUT STATUS MIGRAINOSUS, NOT INTRACTABLE, UNSPECIFIED MIGRAINE TYPE: ICD-10-CM

## 2023-10-23 DIAGNOSIS — R51.9 SINUS HEADACHE: Primary | ICD-10-CM

## 2023-10-23 PROCEDURE — 99213 OFFICE O/P EST LOW 20 MIN: CPT | Performed by: INTERNAL MEDICINE

## 2023-10-23 RX ORDER — PROMETHAZINE HYDROCHLORIDE 12.5 MG/1
12.5 TABLET ORAL 3 TIMES DAILY PRN
Qty: 12 TABLET | Refills: 0 | Status: SHIPPED | OUTPATIENT
Start: 2023-10-23 | End: 2023-10-30

## 2023-10-23 RX ORDER — METHYLPREDNISOLONE 4 MG/1
TABLET ORAL
Qty: 1 KIT | Refills: 1 | Status: SHIPPED | OUTPATIENT
Start: 2023-10-23

## 2023-10-23 RX ORDER — CEFPROZIL 500 MG/1
500 TABLET, FILM COATED ORAL 2 TIMES DAILY
Qty: 20 TABLET | Refills: 0 | Status: SHIPPED | OUTPATIENT
Start: 2023-10-23 | End: 2023-11-02

## 2023-10-23 SDOH — ECONOMIC STABILITY: HOUSING INSECURITY
IN THE LAST 12 MONTHS, WAS THERE A TIME WHEN YOU DID NOT HAVE A STEADY PLACE TO SLEEP OR SLEPT IN A SHELTER (INCLUDING NOW)?: NO

## 2023-10-23 SDOH — ECONOMIC STABILITY: FOOD INSECURITY: WITHIN THE PAST 12 MONTHS, THE FOOD YOU BOUGHT JUST DIDN'T LAST AND YOU DIDN'T HAVE MONEY TO GET MORE.: NEVER TRUE

## 2023-10-23 SDOH — ECONOMIC STABILITY: INCOME INSECURITY: HOW HARD IS IT FOR YOU TO PAY FOR THE VERY BASICS LIKE FOOD, HOUSING, MEDICAL CARE, AND HEATING?: NOT HARD AT ALL

## 2023-10-23 SDOH — ECONOMIC STABILITY: FOOD INSECURITY: WITHIN THE PAST 12 MONTHS, YOU WORRIED THAT YOUR FOOD WOULD RUN OUT BEFORE YOU GOT MONEY TO BUY MORE.: NEVER TRUE

## 2023-10-23 ASSESSMENT — ENCOUNTER SYMPTOMS
SINUS PRESSURE: 1
FACIAL SWELLING: 0
SINUS COMPLAINT: 1

## 2023-10-23 ASSESSMENT — PATIENT HEALTH QUESTIONNAIRE - PHQ9: DEPRESSION UNABLE TO ASSESS: URGENT/EMERGENT SITUATION

## 2023-10-23 NOTE — PROGRESS NOTES
1. \"Have you been to the ER, urgent care clinic since your last visit? Hospitalized since your last visit? \" No    2. \"Have you seen or consulted any other health care providers outside of the [address] [place] [address] since your last visit? \" No     3. For patients aged 43-73: Has the patient had a colonoscopy / FIT/ Cologuard? No      If the patient is female:    4. For patients aged 43-66: Has the patient had a mammogram within the past 2 years? Yes - no Care Gap present      5. For patients aged 21-65: Has the patient had a pap smear?  Yes - no Care Gap present

## 2023-10-24 ENCOUNTER — TELEPHONE (OUTPATIENT)
Facility: CLINIC | Age: 48
End: 2023-10-24

## 2023-10-24 RX ORDER — CEFUROXIME AXETIL 500 MG/1
500 TABLET ORAL 2 TIMES DAILY
Qty: 14 TABLET | Refills: 0 | Status: SHIPPED | OUTPATIENT
Start: 2023-10-24 | End: 2023-10-31

## 2023-10-24 NOTE — TELEPHONE ENCOUNTER
Orders Placed This Encounter    cefUROXime (CEFTIN) 500 MG tablet     Sig: Take 1 tablet by mouth 2 times daily for 7 days     Dispense:  14 tablet     Refill:  0

## 2023-10-24 NOTE — TELEPHONE ENCOUNTER
Pt requesting an alternative to medication. She stated the pharmacy notified her that it was on Backorder.      cefPROZIL (CEFZIL) 500 MG tablet

## 2023-11-06 RX ORDER — ERGOTAMINE TARTRATE AND CAFFEINE 1; 100 MG/1; MG/1
TABLET, FILM COATED ORAL
Qty: 30 TABLET | Refills: 3 | Status: SHIPPED | OUTPATIENT
Start: 2023-11-06 | End: 2023-11-08

## 2023-11-07 NOTE — PROGRESS NOTES
Orders Placed This Encounter    ergotamine-caffeine (CAFERGOT) 1-100 MG per tablet     Sig: Two tablets at onset of attack; then 1 tablet every 30 minutes as needed; maximum: 6 tablets per attack; do not exceed 10 tablets/week.     Dispense:  30 tablet     Refill:  3

## 2023-11-13 ENCOUNTER — TELEPHONE (OUTPATIENT)
Facility: CLINIC | Age: 48
End: 2023-11-13

## 2023-11-13 NOTE — TELEPHONE ENCOUNTER
Pharmacy is requesting an prior authorization on patients Rizatriptan Benzoate 10 mg tablet please initiate through covermymeds using Key: [id number]

## 2023-11-16 DIAGNOSIS — G89.29 OTHER CHRONIC PAIN: ICD-10-CM

## 2023-11-16 RX ORDER — BUTALBITAL, ACETAMINOPHEN, CAFFEINE AND CODEINE PHOSPHATE 50; 325; 40; 30 MG/1; MG/1; MG/1; MG/1
CAPSULE ORAL
Qty: 30 CAPSULE | Refills: 1 | Status: SHIPPED | OUTPATIENT
Start: 2023-11-16 | End: 2023-11-16 | Stop reason: SDUPTHER

## 2023-11-17 RX ORDER — BUTALBITAL, ACETAMINOPHEN, CAFFEINE AND CODEINE PHOSPHATE 50; 325; 40; 30 MG/1; MG/1; MG/1; MG/1
CAPSULE ORAL
Qty: 30 CAPSULE | Refills: 1 | Status: SHIPPED
Start: 2023-11-17 | End: 2024-01-03 | Stop reason: SDUPTHER

## 2023-11-22 RX ORDER — ZOLMITRIPTAN 5 MG/1
TABLET, FILM COATED ORAL
Qty: 9 TABLET | Refills: 5 | Status: SHIPPED | OUTPATIENT
Start: 2023-11-22

## 2023-11-22 NOTE — PROGRESS NOTES
Received denial on maxalt  There is also a quantity limit of 9 per month    Preferred drug is zolmitriptan    Orders Placed This Encounter    ZOLMitriptan (ZOMIG) 5 MG tablet     Sig: Take one at the start of a headache. May repeat once in 2 hours if needed.      Dispense:  9 tablet     Refill:  5

## 2023-12-26 ENCOUNTER — TELEPHONE (OUTPATIENT)
Facility: CLINIC | Age: 48
End: 2023-12-26

## 2023-12-26 NOTE — TELEPHONE ENCOUNTER
Request for refill to the [facility] on file.      butalbital-acetaminophen-caffeine-codeine (FIORICET WITH CODEINE) -27-30 MG per capsule [[id number]]    Order Details  Dose, Route, Frequency: As Directed   Dispense Quantity: 30 capsule Refills: 1    Note to Pharmacy: Reduce doses taken as pain becomes manageable         Sig: take 2 capsules by mouth three times a day if needed maximum daily dose of 6 capsules every 24 hours       Last Appointment:  10/23/2023  No future appointments.

## 2024-01-03 DIAGNOSIS — G89.29 OTHER CHRONIC PAIN: ICD-10-CM

## 2024-01-03 RX ORDER — BUTALBITAL, ACETAMINOPHEN, CAFFEINE AND CODEINE PHOSPHATE 50; 325; 40; 30 MG/1; MG/1; MG/1; MG/1
CAPSULE ORAL
Qty: 30 CAPSULE | Refills: 1 | Status: SHIPPED | OUTPATIENT
Start: 2024-01-03 | End: 2024-03-03

## 2024-01-31 ENCOUNTER — PATIENT MESSAGE (OUTPATIENT)
Facility: CLINIC | Age: 49
End: 2024-01-31

## 2024-01-31 NOTE — TELEPHONE ENCOUNTER
From: Nicol Guido  To: Dr. Whitney Landry  Sent: 1/31/2024 10:25 AM EST  Subject: Cervicogenic headaches    I ran across this type of headache the other day and read up on it, this is what I believe is causing weeks of headaches. It makes sense since I have arthritis in my neck and degenerative changes. Can you refill the Robaxin (sp?) and flexeril (sp?)

## 2024-02-06 RX ORDER — VENLAFAXINE HYDROCHLORIDE 225 MG/1
1 TABLET, EXTENDED RELEASE ORAL DAILY
Qty: 90 TABLET | Refills: 3 | Status: SHIPPED | OUTPATIENT
Start: 2024-02-06

## 2024-02-06 RX ORDER — METHOCARBAMOL 750 MG/1
750 TABLET, FILM COATED ORAL 2 TIMES DAILY PRN
Qty: 60 TABLET | Refills: 1 | Status: SHIPPED | OUTPATIENT
Start: 2024-02-06

## 2024-02-06 NOTE — TELEPHONE ENCOUNTER
Orders Placed This Encounter    methocarbamol (ROBAXIN-750) 750 MG tablet     Sig: Take 1 tablet by mouth 2 times daily as needed (neck spasm)     Dispense:  60 tablet     Refill:  1

## 2024-02-23 ENCOUNTER — E-VISIT (OUTPATIENT)
Facility: CLINIC | Age: 49
End: 2024-02-23
Payer: COMMERCIAL

## 2024-02-23 DIAGNOSIS — L08.9 SKIN INFECTION: Primary | ICD-10-CM

## 2024-02-23 PROCEDURE — 99421 OL DIG E/M SVC 5-10 MIN: CPT | Performed by: INTERNAL MEDICINE

## 2024-02-25 ENCOUNTER — PATIENT MESSAGE (OUTPATIENT)
Facility: CLINIC | Age: 49
End: 2024-02-25

## 2024-02-25 RX ORDER — DOXYCYCLINE HYCLATE 100 MG/1
100 CAPSULE ORAL 2 TIMES DAILY
Qty: 20 CAPSULE | Refills: 0 | Status: SHIPPED | OUTPATIENT
Start: 2024-02-25 | End: 2024-03-06

## 2024-02-26 NOTE — TELEPHONE ENCOUNTER
From: Nicol Guido  To: Dr. Whitney Landry  Sent: 2/25/2024 5:12 PM EST  Subject: Staph infection on my nose    I put in an e-visit for a staph infection on my nose. It’s crusting and getting worse. I was hoping to get medicine before it goes to my nostril. The medicine I was on before was an oral antibiotic, I think it started with doxy.. and the topical was muprocin (sp).

## 2024-03-04 ENCOUNTER — PATIENT MESSAGE (OUTPATIENT)
Facility: CLINIC | Age: 49
End: 2024-03-04

## 2024-03-04 DIAGNOSIS — G44.89 OTHER HEADACHE SYNDROME: Primary | ICD-10-CM

## 2024-03-04 DIAGNOSIS — G89.29 OTHER CHRONIC PAIN: ICD-10-CM

## 2024-03-04 RX ORDER — DIAZEPAM 10 MG/1
10 TABLET ORAL EVERY 12 HOURS PRN
Qty: 90 TABLET | Refills: 5 | Status: SHIPPED | OUTPATIENT
Start: 2024-03-04 | End: 2024-08-31

## 2024-03-04 RX ORDER — BUTALBITAL, ACETAMINOPHEN, CAFFEINE AND CODEINE PHOSPHATE 50; 325; 40; 30 MG/1; MG/1; MG/1; MG/1
CAPSULE ORAL
Qty: 30 CAPSULE | Refills: 1 | Status: SHIPPED | OUTPATIENT
Start: 2024-03-04 | End: 2024-05-03

## 2024-03-04 NOTE — TELEPHONE ENCOUNTER
From: Nicol Guido  To: Dr. Whitney Landry  Sent: 3/4/2024 6:17 AM EST  Subject: Refills to new pharmacy    I don’t see the two medications that need refilled, on my medications list to select for refill. Would you please send Diazepam 10 mg #90 and the Butab-acetaminophen in to my new pharmacy. Thank you.

## 2024-03-04 NOTE — TELEPHONE ENCOUNTER
Orders Placed This Encounter    butalbital-acetaminophen-caffeine-codeine (FIORICET WITH CODEINE) -38-30 MG per capsule     Sig: take 2 capsules by mouth three times a day if needed maximum daily dose of 6 capsules every 24 hours     Dispense:  30 capsule     Refill:  1     Reduce doses taken as pain becomes manageable    diazePAM (VALIUM) 10 MG tablet     Sig: Take 1 tablet by mouth every 12 hours as needed for Anxiety for up to 180 days. Max Daily Amount: 20 mg     Dispense:  90 tablet     Refill:  5     Encounter Diagnoses   Name Primary?    Other chronic pain     Other headache syndrome Yes

## 2024-04-23 DIAGNOSIS — G89.29 OTHER CHRONIC PAIN: ICD-10-CM

## 2024-04-23 RX ORDER — BUTALBITAL, ACETAMINOPHEN, CAFFEINE AND CODEINE PHOSPHATE 50; 325; 40; 30 MG/1; MG/1; MG/1; MG/1
CAPSULE ORAL
Qty: 180 CAPSULE | Refills: 1 | Status: SHIPPED | OUTPATIENT
Start: 2024-04-23 | End: 2024-06-23

## 2024-04-23 NOTE — TELEPHONE ENCOUNTER
Last Appointment:  2/23/2024  Future Appointments   Date Time Provider Department Center   5/10/2024  8:45 AM [facility] 1 [facility]

## 2024-04-24 ENCOUNTER — TELEPHONE (OUTPATIENT)
Facility: CLINIC | Age: 49
End: 2024-04-24

## 2024-05-10 ENCOUNTER — HOSPITAL ENCOUNTER (OUTPATIENT)
Dept: WOMENS IMAGING | Facility: HOSPITAL | Age: 49
Discharge: HOME | End: 2024-05-10
Payer: COMMERCIAL

## 2024-05-10 VITALS — HEIGHT: 66 IN | WEIGHT: 147 LBS | BODY MASS INDEX: 23.63 KG/M2

## 2024-05-10 DIAGNOSIS — Z12.31 SCREENING MAMMOGRAM FOR BREAST CANCER: ICD-10-CM

## 2024-05-10 PROCEDURE — 77067 SCR MAMMO BI INCL CAD: CPT

## 2024-05-10 PROCEDURE — 77063 BREAST TOMOSYNTHESIS BI: CPT

## 2024-07-29 RX ORDER — METHOCARBAMOL 750 MG/1
750 TABLET, FILM COATED ORAL 2 TIMES DAILY PRN
Qty: 60 TABLET | Refills: 1 | Status: SHIPPED | OUTPATIENT
Start: 2024-07-29

## 2024-09-11 DIAGNOSIS — G44.89 OTHER HEADACHE SYNDROME: ICD-10-CM

## 2024-09-11 DIAGNOSIS — G89.29 OTHER CHRONIC PAIN: ICD-10-CM

## 2024-09-11 RX ORDER — DIAZEPAM 10 MG
10 TABLET ORAL EVERY 12 HOURS PRN
Qty: 90 TABLET | Refills: 2 | Status: SHIPPED | OUTPATIENT
Start: 2024-09-11 | End: 2025-03-10

## 2024-10-08 ENCOUNTER — OFFICE VISIT (OUTPATIENT)
Facility: CLINIC | Age: 49
End: 2024-10-08
Payer: COMMERCIAL

## 2024-10-08 VITALS
HEART RATE: 88 BPM | SYSTOLIC BLOOD PRESSURE: 121 MMHG | BODY MASS INDEX: 22.88 KG/M2 | TEMPERATURE: 45 F | DIASTOLIC BLOOD PRESSURE: 76 MMHG | OXYGEN SATURATION: 98 % | RESPIRATION RATE: 14 BRPM | WEIGHT: 142.38 LBS | HEIGHT: 66 IN

## 2024-10-08 DIAGNOSIS — E78.5 HYPERLIPIDEMIA, UNSPECIFIED HYPERLIPIDEMIA TYPE: ICD-10-CM

## 2024-10-08 DIAGNOSIS — F33.0 MAJOR DEPRESSIVE DISORDER, RECURRENT, MILD (HCC): ICD-10-CM

## 2024-10-08 DIAGNOSIS — R53.83 OTHER FATIGUE: Primary | ICD-10-CM

## 2024-10-08 DIAGNOSIS — Z11.4 SCREENING FOR HIV (HUMAN IMMUNODEFICIENCY VIRUS): ICD-10-CM

## 2024-10-08 DIAGNOSIS — N95.1 PERI-MENOPAUSE: ICD-10-CM

## 2024-10-08 DIAGNOSIS — Z13.1 SCREENING FOR DIABETES MELLITUS (DM): ICD-10-CM

## 2024-10-08 DIAGNOSIS — Z86.19 HISTORY OF RECURRENT INFECTION: ICD-10-CM

## 2024-10-08 PROCEDURE — 99214 OFFICE O/P EST MOD 30 MIN: CPT | Performed by: INTERNAL MEDICINE

## 2024-10-08 SDOH — ECONOMIC STABILITY: FOOD INSECURITY: WITHIN THE PAST 12 MONTHS, THE FOOD YOU BOUGHT JUST DIDN'T LAST AND YOU DIDN'T HAVE MONEY TO GET MORE.: NEVER TRUE

## 2024-10-08 SDOH — ECONOMIC STABILITY: FOOD INSECURITY: WITHIN THE PAST 12 MONTHS, YOU WORRIED THAT YOUR FOOD WOULD RUN OUT BEFORE YOU GOT MONEY TO BUY MORE.: NEVER TRUE

## 2024-10-08 SDOH — ECONOMIC STABILITY: INCOME INSECURITY: HOW HARD IS IT FOR YOU TO PAY FOR THE VERY BASICS LIKE FOOD, HOUSING, MEDICAL CARE, AND HEATING?: NOT HARD AT ALL

## 2024-10-08 ASSESSMENT — ENCOUNTER SYMPTOMS
SHORTNESS OF BREATH: 0
CHEST TIGHTNESS: 0

## 2024-10-08 ASSESSMENT — PATIENT HEALTH QUESTIONNAIRE - PHQ9
SUM OF ALL RESPONSES TO PHQ QUESTIONS 1-9: 14
3. TROUBLE FALLING OR STAYING ASLEEP: NOT AT ALL
4. FEELING TIRED OR HAVING LITTLE ENERGY: NEARLY EVERY DAY
10. IF YOU CHECKED OFF ANY PROBLEMS, HOW DIFFICULT HAVE THESE PROBLEMS MADE IT FOR YOU TO DO YOUR WORK, TAKE CARE OF THINGS AT HOME, OR GET ALONG WITH OTHER PEOPLE: VERY DIFFICULT
2. FEELING DOWN, DEPRESSED OR HOPELESS: NEARLY EVERY DAY
5. POOR APPETITE OR OVEREATING: SEVERAL DAYS
7. TROUBLE CONCENTRATING ON THINGS, SUCH AS READING THE NEWSPAPER OR WATCHING TELEVISION: MORE THAN HALF THE DAYS
SUM OF ALL RESPONSES TO PHQ QUESTIONS 1-9: 14
SUM OF ALL RESPONSES TO PHQ9 QUESTIONS 1 & 2: 6
9. THOUGHTS THAT YOU WOULD BE BETTER OFF DEAD, OR OF HURTING YOURSELF: NOT AT ALL
1. LITTLE INTEREST OR PLEASURE IN DOING THINGS: NEARLY EVERY DAY
SUM OF ALL RESPONSES TO PHQ QUESTIONS 1-9: 14
SUM OF ALL RESPONSES TO PHQ QUESTIONS 1-9: 14
6. FEELING BAD ABOUT YOURSELF - OR THAT YOU ARE A FAILURE OR HAVE LET YOURSELF OR YOUR FAMILY DOWN: MORE THAN HALF THE DAYS
8. MOVING OR SPEAKING SO SLOWLY THAT OTHER PEOPLE COULD HAVE NOTICED. OR THE OPPOSITE, BEING SO FIGETY OR RESTLESS THAT YOU HAVE BEEN MOVING AROUND A LOT MORE THAN USUAL: NOT AT ALL

## 2024-10-08 NOTE — PROGRESS NOTES
HISTORY OF PRESENT ILLNESS      Nicol Guido is a 49 y.o. female.    /76 (Site: Right Upper Arm, Position: Sitting, Cuff Size: Small Adult)   Pulse 88   Temp (!) 45 °F (7.2 °C) (Temporal)   Resp 14   Ht 1.676 m (5' 6\")   Wt 64.6 kg (142 lb 6 oz)   LMP 10/02/2024 (Exact Date)   SpO2 98%   BMI 22.98 kg/m²     Here for routine f/u. Has not been seen in a year.    This year, since being on her current job, she gets sick several times a year. Her boss brings his kids into the office. She is susceptible to getting any virus. She says the office is not cleaned well. They do not have a cleaning service.  Someone does clean the bathroom but not the work area  She is concerned about why she keeps getting sick      She reports that her thyroid is stable. Nodules are non cancerous type. Her tests have been stable. Dr. Frankie Ford    Also having gely-menopausal sxs. Her periods are regular but changing. She is having hot flashes, alma at night. Mood swings.        Review of Systems   Constitutional:  Positive for diaphoresis (menopausal hot flashes).   Respiratory:  Negative for chest tightness and shortness of breath.    Cardiovascular: Negative.    Psychiatric/Behavioral:  Positive for decreased concentration.            Physical Exam  Vitals and nursing note reviewed.   Constitutional:       Appearance: Normal appearance.   HENT:      Head: Normocephalic and atraumatic.      Mouth/Throat:      Comments: Corners of the mouth on the lips have very fine white spots. Not vesicular in appearance  Neurological:      General: No focal deficit present.      Mental Status: She is alert and oriented to person, place, and time.   Psychiatric:         Mood and Affect: Mood normal.         Behavior: Behavior normal.         ASSESSMENT and PLAN      ICD-10-CM    1. Other fatigue  R53.83 Comprehensive Metabolic Panel     CBC with Auto Differential      2. Hyperlipidemia, unspecified hyperlipidemia type  E78.5 Lipid Panel

## 2024-10-08 NOTE — PROGRESS NOTES
\"Have you been to the ER, urgent care clinic since your last visit?  Hospitalized since your last visit?\"    YES - When: approximately 1  weeks ago.  Where and Why: [facility] for high fever and virus - unknown.    “Have you seen or consulted any other health care providers outside our system since your last visit?”    NO      “Have you had a colorectal cancer screening such as a colonoscopy/FIT/Cologuard?    NO    No colonoscopy on file  No cologuard on file  No FIT/FOBT on file   No flexible sigmoidoscopy on file

## 2024-10-09 LAB
ALBUMIN SERPL-MCNC: 4 G/DL (ref 3.9–4.9)
ALP SERPL-CCNC: 65 IU/L (ref 44–121)
ALT SERPL-CCNC: 9 IU/L (ref 0–32)
AST SERPL-CCNC: 9 IU/L (ref 0–40)
BASOPHILS # BLD AUTO: 0.1 X10E3/UL (ref 0–0.2)
BASOPHILS NFR BLD AUTO: 1 %
BILIRUB SERPL-MCNC: <0.2 MG/DL (ref 0–1.2)
BUN SERPL-MCNC: 6 MG/DL (ref 6–24)
BUN/CREAT SERPL: 8 (ref 9–23)
CALCIUM SERPL-MCNC: 9.1 MG/DL (ref 8.7–10.2)
CHLORIDE SERPL-SCNC: 101 MMOL/L (ref 96–106)
CHOLEST SERPL-MCNC: 230 MG/DL (ref 100–199)
CO2 SERPL-SCNC: 25 MMOL/L (ref 20–29)
CREAT SERPL-MCNC: 0.75 MG/DL (ref 0.57–1)
EGFRCR SERPLBLD CKD-EPI 2021: 99 ML/MIN/1.73
EOSINOPHIL # BLD AUTO: 0.1 X10E3/UL (ref 0–0.4)
EOSINOPHIL NFR BLD AUTO: 1 %
ERYTHROCYTE [DISTWIDTH] IN BLOOD BY AUTOMATED COUNT: 11.8 % (ref 11.7–15.4)
GLOBULIN SER CALC-MCNC: 2.6 G/DL (ref 1.5–4.5)
GLUCOSE SERPL-MCNC: 89 MG/DL (ref 70–99)
HBA1C MFR BLD: 5.3 % (ref 4.8–5.6)
HCT VFR BLD AUTO: 31.7 % (ref 34–46.6)
HDLC SERPL-MCNC: 29 MG/DL
HERPES SIMPLEX VIRUS 1 IGG: REACTIVE
HERPES SIMPLEX VIRUS 2 IGG: NON REACTIVE
HGB BLD-MCNC: 10.2 G/DL (ref 11.1–15.9)
HIV 1+2 AB+HIV1 P24 AG SERPL QL IA: NON REACTIVE
IMM GRANULOCYTES # BLD AUTO: 0.1 X10E3/UL (ref 0–0.1)
IMM GRANULOCYTES NFR BLD AUTO: 1 %
LDLC SERPL CALC-MCNC: 171 MG/DL (ref 0–99)
LYMPHOCYTES # BLD AUTO: 2.6 X10E3/UL (ref 0.7–3.1)
LYMPHOCYTES NFR BLD AUTO: 30 %
MCH RBC QN AUTO: 31.9 PG (ref 26.6–33)
MCHC RBC AUTO-ENTMCNC: 32.2 G/DL (ref 31.5–35.7)
MCV RBC AUTO: 99 FL (ref 79–97)
MONOCYTES # BLD AUTO: 0.6 X10E3/UL (ref 0.1–0.9)
MONOCYTES NFR BLD AUTO: 7 %
NEUTROPHILS # BLD AUTO: 5.2 X10E3/UL (ref 1.4–7)
NEUTROPHILS NFR BLD AUTO: 60 %
PLATELET # BLD AUTO: 528 X10E3/UL (ref 150–450)
POTASSIUM SERPL-SCNC: 4.4 MMOL/L (ref 3.5–5.2)
PROT SERPL-MCNC: 6.6 G/DL (ref 6–8.5)
RBC # BLD AUTO: 3.2 X10E6/UL (ref 3.77–5.28)
SODIUM SERPL-SCNC: 141 MMOL/L (ref 134–144)
TRIGL SERPL-MCNC: 161 MG/DL (ref 0–149)
VLDLC SERPL CALC-MCNC: 30 MG/DL (ref 5–40)
WBC # BLD AUTO: 8.7 X10E3/UL (ref 3.4–10.8)

## 2024-10-10 LAB
IGA SERPL-MCNC: 198 MG/DL (ref 87–352)
IGE SERPL-ACNC: 42 IU/ML (ref 6–495)
IGG SERPL-MCNC: 985 MG/DL (ref 586–1602)
IGM SERPL-MCNC: 106 MG/DL (ref 26–217)

## 2024-10-11 RX ORDER — CLOTRIMAZOLE AND BETAMETHASONE DIPROPIONATE 10; .64 MG/G; MG/G
CREAM TOPICAL
Qty: 15 G | Refills: 1 | Status: SHIPPED | OUTPATIENT
Start: 2024-10-11

## 2024-10-11 NOTE — PROGRESS NOTES
Orders Placed This Encounter    clotrimazole-betamethasone (LOTRISONE) 1-0.05 % cream     Sig: Apply topically 2 times daily.     Dispense:  15 g     Refill:  1

## 2024-12-14 DIAGNOSIS — G89.29 OTHER CHRONIC PAIN: ICD-10-CM

## 2024-12-15 RX ORDER — BUTALBITAL, ACETAMINOPHEN, CAFFEINE AND CODEINE PHOSPHATE 50; 325; 40; 30 MG/1; MG/1; MG/1; MG/1
CAPSULE ORAL
Qty: 180 CAPSULE | Refills: 1 | Status: SHIPPED | OUTPATIENT
Start: 2024-12-15 | End: 2025-06-15

## 2024-12-17 ENCOUNTER — TELEPHONE (OUTPATIENT)
Facility: CLINIC | Age: 49
End: 2024-12-17

## 2024-12-17 NOTE — TELEPHONE ENCOUNTER
Prior authorization is needed for patients Butalbital/APAP=Caff-Cod 50/325/40/30 mg tablet please initiate through cover my meds using KEY: [id number]

## 2024-12-18 NOTE — TELEPHONE ENCOUNTER
butalbital-acetaminophen-caffeine-codeine (FIORICET WITH CODEINE) -16-30 MG per capsule  [facility]  Note from payer: [place] Case: [id number], Status: Approved, Coverage Starts on: 12/16/2024 12:00:00 AM, Coverage Ends on: 6/14/2025 12:00:00 AM.

## 2025-01-07 ENCOUNTER — COMMUNITY OUTREACH (OUTPATIENT)
Facility: CLINIC | Age: 50
End: 2025-01-07

## 2025-01-25 RX ORDER — VENLAFAXINE HYDROCHLORIDE 225 MG/1
1 TABLET, EXTENDED RELEASE ORAL DAILY
Qty: 30 TABLET | Refills: 1 | Status: SHIPPED | OUTPATIENT
Start: 2025-01-25

## 2025-01-28 ENCOUNTER — PATIENT MESSAGE (OUTPATIENT)
Facility: CLINIC | Age: 50
End: 2025-01-28

## 2025-01-28 DIAGNOSIS — G44.89 OTHER HEADACHE SYNDROME: ICD-10-CM

## 2025-01-28 DIAGNOSIS — G89.29 OTHER CHRONIC PAIN: ICD-10-CM

## 2025-01-28 RX ORDER — DIAZEPAM 10 MG/1
TABLET ORAL
Qty: 90 TABLET | Refills: 2 | Status: SHIPPED | OUTPATIENT
Start: 2025-01-28 | End: 2025-01-30 | Stop reason: SDUPTHER

## 2025-01-28 NOTE — TELEPHONE ENCOUNTER
Last Appointment:  10/8/2024  No future appointments.     Appointment reminder sent via [employer].

## 2025-01-28 NOTE — TELEPHONE ENCOUNTER
Last Appointment:  Visit date not found  No future appointments.     Appointment reminder sent via [employer].

## 2025-01-30 ENCOUNTER — OFFICE VISIT (OUTPATIENT)
Facility: CLINIC | Age: 50
End: 2025-01-30
Payer: COMMERCIAL

## 2025-01-30 VITALS
DIASTOLIC BLOOD PRESSURE: 67 MMHG | SYSTOLIC BLOOD PRESSURE: 114 MMHG | BODY MASS INDEX: 22.82 KG/M2 | HEIGHT: 66 IN | RESPIRATION RATE: 16 BRPM | HEART RATE: 67 BPM | WEIGHT: 142 LBS | OXYGEN SATURATION: 98 % | TEMPERATURE: 97 F

## 2025-01-30 DIAGNOSIS — Z76.0 MEDICATION REFILL: ICD-10-CM

## 2025-01-30 DIAGNOSIS — Z86.19 H/O COLD SORES: ICD-10-CM

## 2025-01-30 DIAGNOSIS — G89.29 OTHER CHRONIC PAIN: ICD-10-CM

## 2025-01-30 DIAGNOSIS — H00.019 HORDEOLUM EXTERNUM, UNSPECIFIED LATERALITY: ICD-10-CM

## 2025-01-30 DIAGNOSIS — N95.1 PERIMENOPAUSE: Primary | ICD-10-CM

## 2025-01-30 DIAGNOSIS — G44.89 OTHER HEADACHE SYNDROME: ICD-10-CM

## 2025-01-30 PROCEDURE — 99214 OFFICE O/P EST MOD 30 MIN: CPT | Performed by: INTERNAL MEDICINE

## 2025-01-30 RX ORDER — DIAZEPAM 10 MG/1
TABLET ORAL
Qty: 90 TABLET | Refills: 2 | Status: SHIPPED | OUTPATIENT
Start: 2025-01-30 | End: 2025-07-30

## 2025-01-30 RX ORDER — POLYMYXIN B SULFATE AND TRIMETHOPRIM 1; 10000 MG/ML; [USP'U]/ML
1 SOLUTION OPHTHALMIC EVERY 4 HOURS
Qty: 2 ML | Refills: 0 | Status: SHIPPED | OUTPATIENT
Start: 2025-01-30 | End: 2025-02-06

## 2025-01-30 RX ORDER — VALACYCLOVIR HYDROCHLORIDE 1 G/1
2000 TABLET, FILM COATED ORAL 2 TIMES DAILY
Qty: 4 TABLET | Refills: 5 | Status: SHIPPED | OUTPATIENT
Start: 2025-01-30 | End: 2025-01-31

## 2025-01-30 SDOH — ECONOMIC STABILITY: TRANSPORTATION INSECURITY
IN THE PAST 12 MONTHS, HAS THE LACK OF TRANSPORTATION KEPT YOU FROM MEDICAL APPOINTMENTS OR FROM GETTING MEDICATIONS?: NO

## 2025-01-30 SDOH — ECONOMIC STABILITY: FOOD INSECURITY: WITHIN THE PAST 12 MONTHS, YOU WORRIED THAT YOUR FOOD WOULD RUN OUT BEFORE YOU GOT MONEY TO BUY MORE.: NEVER TRUE

## 2025-01-30 SDOH — ECONOMIC STABILITY: INCOME INSECURITY: IN THE LAST 12 MONTHS, WAS THERE A TIME WHEN YOU WERE NOT ABLE TO PAY THE MORTGAGE OR RENT ON TIME?: NO

## 2025-01-30 SDOH — ECONOMIC STABILITY: FOOD INSECURITY: WITHIN THE PAST 12 MONTHS, THE FOOD YOU BOUGHT JUST DIDN'T LAST AND YOU DIDN'T HAVE MONEY TO GET MORE.: NEVER TRUE

## 2025-01-30 SDOH — ECONOMIC STABILITY: TRANSPORTATION INSECURITY
IN THE PAST 12 MONTHS, HAS LACK OF TRANSPORTATION KEPT YOU FROM MEETINGS, WORK, OR FROM GETTING THINGS NEEDED FOR DAILY LIVING?: NO

## 2025-01-30 ASSESSMENT — PATIENT HEALTH QUESTIONNAIRE - PHQ9
9. THOUGHTS THAT YOU WOULD BE BETTER OFF DEAD, OR OF HURTING YOURSELF: NOT AT ALL
2. FEELING DOWN, DEPRESSED OR HOPELESS: NOT AT ALL
SUM OF ALL RESPONSES TO PHQ QUESTIONS 1-9: 1
8. MOVING OR SPEAKING SO SLOWLY THAT OTHER PEOPLE COULD HAVE NOTICED. OR THE OPPOSITE, BEING SO FIGETY OR RESTLESS THAT YOU HAVE BEEN MOVING AROUND A LOT MORE THAN USUAL: NOT AT ALL
6. FEELING BAD ABOUT YOURSELF - OR THAT YOU ARE A FAILURE OR HAVE LET YOURSELF OR YOUR FAMILY DOWN: NOT AT ALL
1. LITTLE INTEREST OR PLEASURE IN DOING THINGS: NOT AT ALL
7. TROUBLE CONCENTRATING ON THINGS, SUCH AS READING THE NEWSPAPER OR WATCHING TELEVISION: NOT AT ALL
10. IF YOU CHECKED OFF ANY PROBLEMS, HOW DIFFICULT HAVE THESE PROBLEMS MADE IT FOR YOU TO DO YOUR WORK, TAKE CARE OF THINGS AT HOME, OR GET ALONG WITH OTHER PEOPLE: NOT DIFFICULT AT ALL
3. TROUBLE FALLING OR STAYING ASLEEP: NOT AT ALL
SUM OF ALL RESPONSES TO PHQ9 QUESTIONS 1 & 2: 0
SUM OF ALL RESPONSES TO PHQ QUESTIONS 1-9: 1
4. FEELING TIRED OR HAVING LITTLE ENERGY: SEVERAL DAYS
5. POOR APPETITE OR OVEREATING: NOT AT ALL
SUM OF ALL RESPONSES TO PHQ QUESTIONS 1-9: 1
SUM OF ALL RESPONSES TO PHQ QUESTIONS 1-9: 1

## 2025-01-30 NOTE — PROGRESS NOTES
\"Have you been to the ER, urgent care clinic since your last visit?  Hospitalized since your last visit?\"    YES - When: approximately 2  weeks ago.  Where and Why: throat, white patch, neg for strep & flu Urgent care [facility] .    “Have you seen or consulted any other health care providers outside our system since your last visit?”    NO      “Have you had a colorectal cancer screening such as a colonoscopy/FIT/Cologuard?    NO    No colonoscopy on file  No cologuard on file  No FIT/FOBT on file   No flexible sigmoidoscopy on file

## 2025-01-30 NOTE — PROGRESS NOTES
HISTORY OF PRESENT ILLNESS      Nicol Guido is a 49 y.o. female.    /67 (Site: Right Upper Arm, Position: Sitting, Cuff Size: Medium Adult)   Pulse 67   Temp 97 °F (36.1 °C) (Temporal)   Resp 16   Ht 1.676 m (5' 6\")   Wt 64.4 kg (142 lb)   LMP 01/13/2025   SpO2 98%   BMI 22.92 kg/m²         Has a bump in left eye about 2 days, a right one about 10 days. They are a little sore and is irritating her cornea    On 1/21/25 she was seen at [facility] for a white spot on her throat.      She had a cold sore at [place]. 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  She would like something for it should it recall.      Has perimenopausal sxs. Irregular menses. Longer, heavier, mood swings., night sweats. About the same  She is not ready to try HRT.        Depression  Visit Type: follow-up  Patient presents with the following symptoms: decreased concentration, depressed mood and irritability.      Eye Problem   The right eye is affected. This is a new problem. The current episode started 1 to 4 weeks ago.       Review of Systems   Constitutional:  Positive for irritability.   Psychiatric/Behavioral:  Positive for decreased concentration and depression.            Physical Exam  Vitals and nursing note reviewed.   Constitutional:       Appearance: Normal appearance.   HENT:      Head: Normocephalic and atraumatic.   Eyes:      General:         Right eye: Hordeolum (suspected) present.         Left eye: Hordeolum (suspected.) present.    Cardiovascular:      Rate and Rhythm: Normal rate.   Pulmonary:      Effort: Pulmonary effort is normal.   Skin:     General: Skin is warm and dry.   Neurological:      General: No focal deficit present.      Mental Status: She is alert and oriented to person, place, and time.   Psychiatric:         Mood and Affect: Mood normal.         Behavior: Behavior normal.         ASSESSMENT and PLAN      ICD-10-CM    1. Perimenopause  N95.1       2. Hordeolum externum, unspecified laterality

## 2025-02-22 NOTE — TELEPHONE ENCOUNTER
Appointment reminder sent via [employer].  Return in about 3 months (around 4/30/2025) for perimenopause, headaches, med check.     Pharmacy is requesting 90 day supply.

## 2025-02-23 RX ORDER — VENLAFAXINE HYDROCHLORIDE 225 MG/1
1 TABLET, EXTENDED RELEASE ORAL DAILY
Qty: 90 TABLET | Refills: 1 | Status: SHIPPED | OUTPATIENT
Start: 2025-02-23

## 2025-04-16 ENCOUNTER — TRANSCRIBE ORDERS (OUTPATIENT)
Facility: HOSPITAL | Age: 50
End: 2025-04-16

## 2025-04-16 DIAGNOSIS — Z12.31 VISIT FOR SCREENING MAMMOGRAM: Primary | ICD-10-CM

## 2025-05-11 RX ORDER — ZOLMITRIPTAN 5 MG/1
TABLET, FILM COATED ORAL
Qty: 9 TABLET | Refills: 3 | Status: SHIPPED | OUTPATIENT
Start: 2025-05-11

## 2025-05-11 RX ORDER — NAPROXEN 500 MG/1
TABLET ORAL
Qty: 60 TABLET | Refills: 5 | Status: SHIPPED | OUTPATIENT
Start: 2025-05-11

## 2025-05-12 ENCOUNTER — TELEPHONE (OUTPATIENT)
Facility: CLINIC | Age: 50
End: 2025-05-12

## 2025-05-12 NOTE — TELEPHONE ENCOUNTER
Patient is needing her Rx of   ZOLMitriptan (ZOMIG) 5 MG tablet [[id number]]    Order Details  Dose, Route, Frequency: As Directed   Dispense Quantity: 9 tablet Refills: 3          Sig: TAKE 1 TABLET BY MOUTH AT ONSET OF HEADACHE. MAY REPEAT IN 2 HOURS IF HEADACHE PERSISTS   To be called in with a 7 quantity and not the 9 she states that her insurance will not cover please advise

## 2025-05-13 ENCOUNTER — HOSPITAL ENCOUNTER (OUTPATIENT)
Dept: WOMENS IMAGING | Facility: HOSPITAL | Age: 50
Discharge: HOME | End: 2025-05-16
Attending: INTERNAL MEDICINE
Payer: COMMERCIAL

## 2025-05-13 DIAGNOSIS — Z12.31 VISIT FOR SCREENING MAMMOGRAM: ICD-10-CM

## 2025-05-13 PROCEDURE — 77063 BREAST TOMOSYNTHESIS BI: CPT

## 2025-05-14 NOTE — TELEPHONE ENCOUNTER
Called pt and left message. Call back number left.    The call was to inform pt of the PCP's note regarding med.

## 2025-05-16 NOTE — TELEPHONE ENCOUNTER
Spoke with pt in regards to medication follow up. Two patient identifier's verified.  Relayed the PCP's note. Pt states she was able to notify the pharmacy the limit is 7 and the pharmacy is now filling the medication. No further concerns were voiced at this time.

## 2025-06-06 RX ORDER — METHOCARBAMOL 750 MG/1
750 TABLET, FILM COATED ORAL 2 TIMES DAILY PRN
Qty: 60 TABLET | Refills: 5 | Status: SHIPPED | OUTPATIENT
Start: 2025-06-06

## 2025-06-11 ENCOUNTER — OFFICE VISIT (OUTPATIENT)
Facility: CLINIC | Age: 50
End: 2025-06-11
Payer: COMMERCIAL

## 2025-06-11 VITALS
SYSTOLIC BLOOD PRESSURE: 117 MMHG | WEIGHT: 144.8 LBS | OXYGEN SATURATION: 100 % | RESPIRATION RATE: 16 BRPM | HEIGHT: 66 IN | TEMPERATURE: 97.5 F | DIASTOLIC BLOOD PRESSURE: 69 MMHG | BODY MASS INDEX: 23.27 KG/M2 | HEART RATE: 76 BPM

## 2025-06-11 DIAGNOSIS — R53.83 OTHER FATIGUE: ICD-10-CM

## 2025-06-11 DIAGNOSIS — F48.9 MENTAL AND BEHAVIORAL PROBLEM IN ADULT: Primary | ICD-10-CM

## 2025-06-11 DIAGNOSIS — F69 MENTAL AND BEHAVIORAL PROBLEM IN ADULT: Primary | ICD-10-CM

## 2025-06-11 DIAGNOSIS — F43.9 STRESS: ICD-10-CM

## 2025-06-11 PROCEDURE — 99213 OFFICE O/P EST LOW 20 MIN: CPT | Performed by: INTERNAL MEDICINE

## 2025-06-11 SDOH — ECONOMIC STABILITY: FOOD INSECURITY: WITHIN THE PAST 12 MONTHS, THE FOOD YOU BOUGHT JUST DIDN'T LAST AND YOU DIDN'T HAVE MONEY TO GET MORE.: NEVER TRUE

## 2025-06-11 SDOH — ECONOMIC STABILITY: FOOD INSECURITY: WITHIN THE PAST 12 MONTHS, YOU WORRIED THAT YOUR FOOD WOULD RUN OUT BEFORE YOU GOT MONEY TO BUY MORE.: NEVER TRUE

## 2025-06-11 ASSESSMENT — PATIENT HEALTH QUESTIONNAIRE - PHQ9
2. FEELING DOWN, DEPRESSED OR HOPELESS: SEVERAL DAYS
SUM OF ALL RESPONSES TO PHQ QUESTIONS 1-9: 1
SUM OF ALL RESPONSES TO PHQ QUESTIONS 1-9: 1
1. LITTLE INTEREST OR PLEASURE IN DOING THINGS: NOT AT ALL
SUM OF ALL RESPONSES TO PHQ QUESTIONS 1-9: 1
SUM OF ALL RESPONSES TO PHQ QUESTIONS 1-9: 1

## 2025-06-11 NOTE — PROGRESS NOTES
Have you been to the ER, urgent care clinic since your last visit?  Hospitalized since your last visit?   NO    Have you seen or consulted any other health care providers outside our system since your last visit?   NO      “Have you had a colorectal cancer screening such as a colonoscopy/FIT/Cologuard?    NO    No colonoscopy on file  No cologuard on file  No FIT/FOBT on file   No flexible sigmoidoscopy on file
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
## 2025-06-23 DIAGNOSIS — G89.29 OTHER CHRONIC PAIN: ICD-10-CM

## 2025-06-23 DIAGNOSIS — G44.89 OTHER HEADACHE SYNDROME: Primary | ICD-10-CM

## 2025-06-23 RX ORDER — BUTALBITAL, ACETAMINOPHEN, CAFFEINE AND CODEINE PHOSPHATE 50; 325; 40; 30 MG/1; MG/1; MG/1; MG/1
CAPSULE ORAL
Qty: 180 CAPSULE | Refills: 2 | Status: SHIPPED | OUTPATIENT
Start: 2025-06-23 | End: 2025-09-23

## 2025-06-23 NOTE — TELEPHONE ENCOUNTER
Ms. Guido is requesting refills of:    Requested Prescriptions     Pending Prescriptions Disp Refills    butalbital-acetaminophen-caffeine-codeine (FIORICET WITH CODEINE) -23-30 MG per capsule [Pharmacy Med Name: [id number] ] 180 capsule      Sig: TAKE 2 CAPSULES BY MOUTH THREE TIMES A DAY IF NEEDED MAXIMUM DAILY DOSE OF 6 CAPSULES EVERY 24 HOURS         to be sent to   [facility]/pharmacy #[id number] - [place], [place] - [address] - P [phone] - F [phone]  [address] [address]  Phone: [phone] Fax: [phone]  .     LAST OFFICE VISIT:  6/11/2025     UPCOMING APPOINTMENT(S):  No future appointments.      Provided notified

## 2025-06-24 NOTE — TELEPHONE ENCOUNTER
Orders Placed This Encounter    butalbital-acetaminophen-caffeine-codeine (FIORICET WITH CODEINE) -48-30 MG per capsule     Sig: TAKE 2 CAPSULES BY MOUTH THREE TIMES A DAY IF NEEDED MAXIMUM DAILY DOSE OF 6 CAPSULES EVERY 24 HOURS     Dispense:  180 capsule     Refill:  2     This request is for a new prescription for a controlled substance as required by Federal/State law.     Encounter Diagnoses   Name Primary?    Other chronic pain     Other headache syndrome Yes

## 2025-06-27 ENCOUNTER — TELEPHONE (OUTPATIENT)
Facility: CLINIC | Age: 50
End: 2025-06-27

## 2025-06-27 NOTE — TELEPHONE ENCOUNTER
Patient is calling to check the status of her PA for butalbital-acetaminophen-caffeine-codeine (FIORICET WITH CODEINE) -73-30 MG per capsules.

## 2025-08-11 DIAGNOSIS — G44.89 OTHER HEADACHE SYNDROME: ICD-10-CM

## 2025-08-11 DIAGNOSIS — G89.29 OTHER CHRONIC PAIN: ICD-10-CM

## 2025-08-11 RX ORDER — DIAZEPAM 10 MG/1
TABLET ORAL
Qty: 90 TABLET | Refills: 2 | Status: SHIPPED | OUTPATIENT
Start: 2025-08-11 | End: 2026-02-11

## 2025-08-11 RX ORDER — VENLAFAXINE HYDROCHLORIDE 225 MG/1
1 TABLET, EXTENDED RELEASE ORAL DAILY
Qty: 90 TABLET | Refills: 1 | Status: SHIPPED | OUTPATIENT
Start: 2025-08-11